# Patient Record
Sex: MALE | Race: WHITE | NOT HISPANIC OR LATINO | Employment: STUDENT | URBAN - METROPOLITAN AREA
[De-identification: names, ages, dates, MRNs, and addresses within clinical notes are randomized per-mention and may not be internally consistent; named-entity substitution may affect disease eponyms.]

---
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
## 2020-07-03 ENCOUNTER — OFFICE VISIT - RIVER FALLS (OUTPATIENT)
Dept: FAMILY MEDICINE | Facility: CLINIC | Age: 20
End: 2020-07-03

## 2020-07-03 ASSESSMENT — MIFFLIN-ST. JEOR: SCORE: 2120.62

## 2020-11-03 ENCOUNTER — OFFICE VISIT - RIVER FALLS (OUTPATIENT)
Dept: FAMILY MEDICINE | Facility: CLINIC | Age: 20
End: 2020-11-03

## 2020-11-03 ASSESSMENT — MIFFLIN-ST. JEOR: SCORE: 2183.22

## 2020-11-05 LAB
CHLAMYDIA TRACHOMATIS RNA, TMA - QUEST: DETECTED
HIV AG/AB  - NOTE: NORMAL
Lab: ABNORMAL
NEISSERIA GONORRHOEAE RNA TMA: NOT DETECTED
RPR TITER: ABNORMAL
RPR: REACTIVE

## 2020-12-03 ENCOUNTER — OFFICE VISIT - RIVER FALLS (OUTPATIENT)
Dept: FAMILY MEDICINE | Facility: CLINIC | Age: 20
End: 2020-12-03

## 2020-12-05 ENCOUNTER — COMMUNICATION - RIVER FALLS (OUTPATIENT)
Dept: FAMILY MEDICINE | Facility: CLINIC | Age: 20
End: 2020-12-05

## 2020-12-05 LAB
CHLAMYDIA TRACHOMATIS RNA, TMA - QUEST: NOT DETECTED
Lab: ABNORMAL
NEISSERIA GONORRHOEAE RNA TMA: NOT DETECTED
RPR TITER: ABNORMAL
RPR: REACTIVE

## 2022-02-12 VITALS
SYSTOLIC BLOOD PRESSURE: 114 MMHG | HEART RATE: 99 BPM | DIASTOLIC BLOOD PRESSURE: 74 MMHG | WEIGHT: 202.6 LBS | TEMPERATURE: 102.5 F

## 2022-02-12 VITALS
DIASTOLIC BLOOD PRESSURE: 68 MMHG | WEIGHT: 219.2 LBS | BODY MASS INDEX: 25.88 KG/M2 | HEIGHT: 77 IN | HEART RATE: 76 BPM | TEMPERATURE: 97.6 F | SYSTOLIC BLOOD PRESSURE: 118 MMHG

## 2022-02-12 VITALS
HEIGHT: 77 IN | HEART RATE: 80 BPM | SYSTOLIC BLOOD PRESSURE: 134 MMHG | BODY MASS INDEX: 27.04 KG/M2 | DIASTOLIC BLOOD PRESSURE: 62 MMHG | WEIGHT: 229 LBS | TEMPERATURE: 97.8 F

## 2022-02-12 VITALS
HEART RATE: 73 BPM | SYSTOLIC BLOOD PRESSURE: 121 MMHG | BODY MASS INDEX: 26.98 KG/M2 | WEIGHT: 229 LBS | DIASTOLIC BLOOD PRESSURE: 75 MMHG

## 2022-02-12 VITALS
HEART RATE: 84 BPM | HEIGHT: 77 IN | SYSTOLIC BLOOD PRESSURE: 130 MMHG | BODY MASS INDEX: 26.35 KG/M2 | TEMPERATURE: 98.4 F | WEIGHT: 223.2 LBS | DIASTOLIC BLOOD PRESSURE: 82 MMHG

## 2022-02-12 VITALS
SYSTOLIC BLOOD PRESSURE: 134 MMHG | HEIGHT: 77 IN | HEART RATE: 98 BPM | BODY MASS INDEX: 27.51 KG/M2 | TEMPERATURE: 98 F | OXYGEN SATURATION: 98 % | DIASTOLIC BLOOD PRESSURE: 84 MMHG | WEIGHT: 233 LBS

## 2022-02-12 VITALS
HEART RATE: 76 BPM | SYSTOLIC BLOOD PRESSURE: 138 MMHG | WEIGHT: 229 LBS | BODY MASS INDEX: 26.98 KG/M2 | TEMPERATURE: 97.9 F | DIASTOLIC BLOOD PRESSURE: 66 MMHG

## 2022-02-12 VITALS
TEMPERATURE: 97.8 F | WEIGHT: 222.4 LBS | SYSTOLIC BLOOD PRESSURE: 121 MMHG | DIASTOLIC BLOOD PRESSURE: 75 MMHG | WEIGHT: 218.8 LBS | DIASTOLIC BLOOD PRESSURE: 73 MMHG | BODY MASS INDEX: 26.37 KG/M2 | SYSTOLIC BLOOD PRESSURE: 117 MMHG | BODY MASS INDEX: 25.95 KG/M2 | HEART RATE: 106 BPM | TEMPERATURE: 97.4 F | HEART RATE: 74 BPM

## 2022-02-12 VITALS
DIASTOLIC BLOOD PRESSURE: 79 MMHG | WEIGHT: 216 LBS | SYSTOLIC BLOOD PRESSURE: 137 MMHG | BODY MASS INDEX: 25.5 KG/M2 | HEART RATE: 94 BPM | HEIGHT: 77 IN | TEMPERATURE: 97.1 F

## 2022-02-12 VITALS
HEIGHT: 77 IN | BODY MASS INDEX: 27.16 KG/M2 | TEMPERATURE: 96.6 F | RESPIRATION RATE: 16 BRPM | DIASTOLIC BLOOD PRESSURE: 70 MMHG | HEART RATE: 80 BPM | WEIGHT: 230 LBS | SYSTOLIC BLOOD PRESSURE: 114 MMHG

## 2022-02-16 NOTE — PROGRESS NOTES
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    Integumentary   Psychiatric:  Negative except as documented in history of present illness.       Health Status   Allergies:    Allergic Reactions (Selected)  No Known Medication Allergies   Medications:  (Selected)   Prescriptions  Prescribed  Diprolene AF 0.05% topical cream: 1 ananda, Topical, bid, Instructions: to affected area, # 50 gm, 3 Refill(s), Type: Maintenance, Pharmacy: [email or web address], 1 ananda Topical bid,Instr:to affected area  buPROPion 75 mg oral tablet: = 1 tab(s) ( 75 mg ), Oral, bid, # 60 tab(s), 0 Refill(s), Type: Maintenance, Pharmacy: [email or web address], 1 tab(s) Oral bid, 77.25, in, 20 9:15:00 CDT, Height Measured, 219.2, lb, 20 9:15:00 CDT, Weight Measured  escitalopram 20 mg oral tablet: = 1 tab(s), Oral, daily, # 90 tab(s), 3 Refill(s), Type: Soft Stop, Pharmacy: [email or web address], 1 tab(s) Oral daily   Problem list:    All Problems  Anxiety and depression / SNOMED CT 365779893 / Confirmed  Other psoriasis / SNOMED CT 06148999 / Confirmed      Histories   Past Medical History:    No active or resolved past medical history items have been selected or recorded.   Family History:    No family  history items have been selected or recorded.   Procedure history:    No previous procedures.   Social History:        Alcohol Assessment            Never, 2-3 times per week      Tobacco Assessment            Never (less than 100 in lifetime)        Physical Examination   VS/Measurements   Psychiatric:  Cooperative, Non-suicidal.       Impression and Plan   Diagnosis     Anxiety and depression (EKF59-LN F41.9).     Course:  Not improving.    Plan:  Add bupropion   15 min spent Face-to-face   1 mo fu.    Patient Instructions:       Counseled: Patient, Regarding diagnosis, Regarding treatment, Regarding medications.

## 2022-02-16 NOTE — NURSING NOTE
Comprehensive Intake Entered On:  7/3/2020 9:19 AM CDT    Performed On:  7/3/2020 9:15 AM CDT by Raquel Muro CMA               Summary   Chief Complaint :   Anxiety/depression med check/refill   Menstrual Status :   N/A   Weight Measured :   219.2 lb(Converted to: 219 lb 3 oz, 99.43 kg)    Height Measured :   77.25 in(Converted to: 6 ft 5 in, 196.21 cm)    Body Mass Index :   25.82 kg/m2   Body Surface Area :   2.33 m2   Systolic Blood Pressure :   118 mmHg   Diastolic Blood Pressure :   68 mmHg   Mean Arterial Pressure :   85 mmHg   Peripheral Pulse Rate :   76 bpm   BP Site :   Right arm   Pulse Site :   Radial artery   BP Method :   Manual   HR Method :   Manual   Temperature Tympanic :   97.6 DegF(Converted to: 36.4 DegC)  (LOW)    Raquel Muro CMA - 7/3/2020 9:15 AM CDT   Health Status   Allergies Verified? :   Yes   Medication History Verified? :   Yes   Immunizations Current :   Yes   Medical History Verified? :   No   Pre-Visit Planning Status :   Completed   Tobacco Use? :   Current every day smoker   Raquel Muro CMA - 7/3/2020 9:15 AM CDT   Consents   Consent for Immunization Exchange :   Consent Granted   Consent for Immunizations to Providers :   Consent Granted   Raquel Muro CMA - 7/3/2020 9:15 AM CDT   Meds / Allergies   (As Of: 7/3/2020 9:19:23 AM CDT)   Allergies (Active)   No Known Medication Allergies  Estimated Onset Date:   Unspecified ; Created By:   Diana Ireland; Reaction Status:   Active ; Category:   Drug ; Substance:   No Known Medication Allergies ; Type:   Allergy ; Updated By:   Diana Ireland; Reviewed Date:   7/3/2020 9:17 AM CDT        Medication List   (As Of: 7/3/2020 9:19:23 AM CDT)   Prescription/Discharge Order    betamethasone topical  :   betamethasone topical ; Status:   Prescribed ; Ordered As Mnemonic:   Diprolene AF 0.05% topical cream ; Simple Display Line:   1 ananda, Topical, bid, to affected area, 50 gm, 3 Refill(s) ; Ordering Provider:   Cailin MCDOWELL,  Anthony; Catalog Code:   betamethasone topical ; Order Dt/Tm:   8/29/2019 12:14:55 PM CDT          escitalopram  :   escitalopram ; Status:   Prescribed ; Ordered As Mnemonic:   escitalopram 20 mg oral tablet ; Simple Display Line:   1 tab(s), Oral, daily, 90 tab(s), 3 Refill(s) ; Ordering Provider:   Anthony Simeon MD; Catalog Code:   escitalopram ; Order Dt/Tm:   7/19/2019 3:29:05 PM CDT            ID Risk Screen   Recent Travel History :   No recent travel   Family Member Travel History :   No recent travel   Other Exposure to Infectious Disease :   Unknown   Raquel Muro CMA - 7/3/2020 9:15 AM CDT

## 2022-02-16 NOTE — TELEPHONE ENCOUNTER
---------------------  From: Ailyn Jiménez   To: Anthony Simeon MD;     Sent: 7/3/2019 3:36:15 PM CDT  Subject: Scheduling Management     Patient no showed appointment. Appointment was for a med refill/updating vaccines.

## 2022-02-16 NOTE — PROGRESS NOTES
Chief Complaint    c/o sore throat x1wk--feeling better this AM. denies having fever. has been coughing also.  History of Present Illness      Here with sore throat for the last week.      no F/C/S      Feeling better today      non productive cough         Review of Systems          ROS reviewed an negative except for symptoms noted in HPI.            Physical Exam   Vitals & Measurements    T: 97.8   F (Tympanic)  HR: 80(Peripheral)  BP: 134/62     HT: 77 in  WT: 229 lb  BMI: 27.15           General:  Alert and oriented, No acute distress.            HENT:  Normocephalic, Tympanic membranes are clear, Normal hearing, Oral mucosa is moist.                 Throat: Tonsils ( Within normal limits ), Pharynx ( Not edematous, Erythematous, No exudate, PND ).            Neck:  anterior cervical adenopathy.            Respiratory:  Lungs are clear to auscultation, Respirations are non-labored.            Cardiovascular:  Normal rate, Regular rhythm.            Integumentary:  Warm, Dry, No rash.  Assessment/Plan       Common cold (J00)         symptomatic care and follow up if not improving         Orders:          71825 office outpatient visit 15 minutes (Charge), Quantity: 1, Common cold           Patient Information     Name:TIM FRANCE      Address:      [address] [address]     Sex:Male     YOB: 2000     [phone]     Emergency Contact:DEMETRICE FRANCE     MRN:[id number]     FIN:[id number]     Location:[facility]     Date of Service:09/25/2018      Primary Care Physician:       Anthony Simeon MD, [phone]      Attending Physician:       Carroll Kunz MD, [phone]  Problem List/Past Medical History    Ongoing     Anxiety and depression    Historical     No qualifying data  Medications     Lexapro 10 mg oral tablet: 10 mg, 1 tab(s), PO, Daily, 30 tab(s), 3 Refill(s).          Allergies    No known allergies  Social History     Smoking Status - 09/25/2018     Never smoker     Alcohol      Never, 07/13/2018  Immunizations      Vaccine Date Status      varicella 11/20/2012 Given      meningococcal conjugate vaccine 11/20/2012 Given      tetanus/diphth/pertuss (Tdap) adult/adol 11/20/2012 Given      IPV 05/23/2006 Recorded      MMR (measles/mumps/rubella) 05/23/2006 Recorded      DTaP 05/23/2006 Recorded      pneumococcal (PCV7) 02/13/2002 Recorded      DTaP 02/13/2002 Recorded      MMR (measles/mumps/rubella) 11/29/2001 Recorded      varicella 11/29/2001 Recorded      IPV 09/13/2001 Recorded      Hib (PRP-T) 09/13/2001 Recorded      Hep B 09/13/2001 Recorded      pneumococcal (PCV7) 02/26/2001 Recorded      DTaP 02/26/2001 Recorded      IPV 01/03/2001 Recorded      Hib (PRP-T) 01/03/2001 Recorded      Hep B 01/03/2001 Recorded      pneumococcal (PCV7) 01/03/2001 Recorded      DTaP 01/03/2001 Recorded      IPV 2000 Recorded      Hib (PRP-T) 2000 Recorded      Hep B 2000 Recorded      DTaP 2000 Recorded      Hep B 2000 Recorded

## 2022-02-16 NOTE — NURSING NOTE
Comprehensive Intake Entered On:  11/3/2020 9:35 AM CST    Performed On:  11/3/2020 9:33 AM CST by Trish Chau CMA               Summary   Chief Complaint :   Discuss STD screening   Menstrual Status :   N/A   Weight Measured :   233 lb(Converted to: 233 lb 0 oz, 105.687 kg)    Height Measured :   77.25 in(Converted to: 6 ft 5 in, 196.21 cm)    Body Mass Index :   27.45 kg/m2 (HI)    Body Surface Area :   2.4 m2   Systolic Blood Pressure :   134 mmHg (HI)    Diastolic Blood Pressure :   84 mmHg (HI)    Mean Arterial Pressure :   101 mmHg   Peripheral Pulse Rate :   98 bpm   BP Site :   Right arm   BP Method :   Manual   Temperature Tympanic :   98.0 DegF(Converted to: 36.7 DegC)    Oxygen Saturation :   98 %   Trish Chau CMA - 11/3/2020 9:33 AM CST   Health Status   Allergies Verified? :   Yes   Medication History Verified? :   Yes   Immunizations Current :   Yes   Pre-Visit Planning Status :   N/A   Tobacco Use? :   Current every day smoker   Trish Chau CMA - 11/3/2020 9:33 AM CST   Meds / Allergies   (As Of: 11/3/2020 9:35:41 AM CST)   Allergies (Active)   No Known Medication Allergies  Estimated Onset Date:   Unspecified ; Created By:   Diana Ireland; Reaction Status:   Active ; Category:   Drug ; Substance:   No Known Medication Allergies ; Type:   Allergy ; Updated By:   Diana Ireland; Reviewed Date:   7/7/2020 1:48 PM CDT        Medication List   (As Of: 11/3/2020 9:35:41 AM CST)   Prescription/Discharge Order    buPROPion  :   buPROPion ; Status:   Prescribed ; Ordered As Mnemonic:   buPROPion 75 mg oral tablet ; Simple Display Line:   75 mg, 1 tab(s), Oral, bid, 28 tab(s), 0 Refill(s) ; Ordering Provider:   Anthony Simeon MD; Catalog Code:   buPROPion ; Order Dt/Tm:   8/3/2020 2:57:15 PM CDT          escitalopram  :   escitalopram ; Status:   Prescribed ; Ordered As Mnemonic:   escitalopram 20 mg oral tablet ; Simple Display Line:   1 tab(s), Oral, daily, 14 tab(s), 0 Refill(s) ; Ordering  Provider:   Anthony Simeon MD; Catalog Code:   escitalopram ; Order Dt/Tm:   8/3/2020 2:57:16 PM CDT            ID Risk Screen   Recent Travel History :   No recent travel   Family Member Travel History :   No recent travel   Other Exposure to Infectious Disease :   Unknown   Trish Chau CMA - 11/3/2020 9:33 AM CST

## 2022-02-16 NOTE — LETTER
(Inserted Image. Unable to display)   March 12, 2019      TIM FRANCE  [id number] [place], [place] [id number]        Dear TIM,      Thank you for selecting [facility] ([facility]viously [place] [facility] for your healthcare needs.    Our records indicate you are due for the following services:       Well Child Exam~ It's important to see your Healthcare Provider on a regular basis to assess growth, development, life changes, safety, health risks and to update your immunizations.      To schedule an appointment or if you have further questions, please contact your primary clinic:   Critical access hospital       [phone]   [facility]       [phone]              [facility]     [phone]      Powered by "[employer] and [employer]    Sincerely,    Anthony Simeon MD

## 2022-02-16 NOTE — LETTER
(Inserted Image. Unable to display)   January 22, 2020        TIM FRANCE  [address] [place], [place] [id number]        Dear TIM,      Thank you for selecting [facility] for your healthcare needs.    Our records indicate you are due for the following services:     Follow-up office visit     To schedule an appointment or if you have further questions, please contact your primary clinic:   [facility]       [phone]   [facility]       [phone]              [facility]     [phone]      Powered by [employer]    Sincerely,    Anthony Simeon M.D.

## 2022-02-16 NOTE — PROGRESS NOTES
patient is called and informed of positive syphilis and chlamydia.  He has already been treated with azithromycin  for chlamydia.  Will add 2 week course of doxycycline.  Retest in 6 weeks

## 2022-02-16 NOTE — TELEPHONE ENCOUNTER
---------------------  From: Ailyn Jiménez   To: Anthony Simeon MD;     Sent: 7/30/2019 11:21:02 AM CDT  Subject: Scheduling Management     Patient no showed appointment. Appointment was for wart removal .

## 2022-02-16 NOTE — TELEPHONE ENCOUNTER
Entered by Dennis Wright CMA on June 14, 2019 9:07:44 AM CDT  ---------------------  From: Dennis Wright CMA   To: [facility] [id number] [place] [facility]    Sent: 6/14/2019 9:07:44 AM CDT  Subject: Medication Management     ** Submitted: **  Complete:escitalopram (Lexapro 20 mg oral tablet)   Signed by Dennis Wright CMA  6/14/2019 9:07:00 AM    ** Approved with modifications: **  escitalopram (ESCITALOPRAM 20 MG TABLET)  TAKE 1 TABLET BY MOUTH EVERY DAY  Qty:  30 tab(s)        Days Supply:  90        Refills:  0          Substitutions Allowed     Route To Pharmacy - [place] [id number] [facility]   Note to Pharmacy:  Pt due for appt for further refills  Signed by Dennis Wright CMA            ------------------------------------------  From: [facility] [id number] [place] [facility]  To: Anthony Simeon MD  Sent: June 14, 2019 7:33:38 AM CDT  Subject: Medication Management  Due: Rima 15, 2019 7:33:38 AM CDT    ** On Hold Pending Signature **  Drug: escitalopram (escitalopram 20 mg oral tablet)  TAKE 1 TABLET BY MOUTH EVERY DAY  Quantity: 30 tab(s)     Days Supply: 90        Refills: 5  Substitutions Allowed  Notes from Pharmacy:     Dispensed Drug: escitalopram (escitalopram 20 mg oral tablet)  TAKE 1 TABLET BY MOUTH EVERY DAY  Quantity: 30 tab(s)     Days Supply: 90        Refills: 5  Substitutions Allowed  Notes from Pharmacy:   ------------------------------------------  ** Submitted: **  Order:Return to Clinic (Request)  Details:  RFV: Anxiety/Depression medcheck, Return in 4 weeks         Signed by Dennis Wright CMA  6/14/2019 9:07:00 AM        Med Refill      Date of last office visit and reason:  12-6-18 Depression      Date of last Med Check / Px:     Date of last labs pertaining to med:      Note:  Rx filled per protocol.  Dennis Wright CMA    RTC order in chart:  yes    For Protocol refill, has patient been contacted:  _

## 2022-02-16 NOTE — TELEPHONE ENCOUNTER
---------------------  From: Diana Ireland   To: Alison Wilson PA-C; Anthony Simeon MD; Phone Messages Pool ([phone]);     Sent: 12/5/2020 12:41:26 PM CST  Subject: Results Follow Up     Please review result and advise if okay to wait for TFS to review.           Results:  Date Result Name Ind Value Ref Range   12/3/2020 10:49 AM Chlam/N. gonorrhea Comments  See comment    12/3/2020 10:49 AM RPR Ql ((A)) REACTIVE (NONREACTIVE - NONREACTIVE)   12/3/2020 10:49 AM RPR Titer ((H)) 1:2    12/3/2020 10:49 AM FTA-ABS  NON-REACTIVE (NONREACTIVE - NONREACTIVE)   12/3/2020 10:49 AM Chlamydia RNA  NOT DETECTED (NOT DETECTED - )   12/3/2020 10:49 AM Neisseria gonorrhoeae RNA  NOT DETECTED (NOT DETECTED - )---------------------  From: Alison Wilson PA-C   To: Anthony Simeon MD; Phone Messages Pool ([phone]); Diana Ireland;     Sent: 12/5/2020 1:33:03 PM CST  Subject: RE: Results Follow Up     The confirmatory FTA-ABS test is non-reactive at baseline and 4 weeks and the RPR titer only increased to 1:2.    This likely represents a false positive nontreponemal test (RPR) which can occur with other chronic conditions including autoimmune disorders.  Will have TFS review based on his clinical knowledge of this pt and may address next week.---------------------  From: Anthony Simeon MD   To: Diana Ireland;     Sent: 12/6/2020 10:54:10 AM CST  Subject: RE: Results Follow Up     please call him  this likely represents a false positive nontreponemal test (RPR) which can occur with other chronic conditions including autoimmune disorders such as  his psoriasis  I want him to get internal medicine referral with Dr Daniels in next month or so to consult on the results and further testing for autoimmune disordersSpoke with Jez and notified of recommendation.  He agrees and will call back later on this week to set up a consult with CHLOE..

## 2022-02-16 NOTE — PROGRESS NOTES
Patient:   TIM FRANCE            MRN: [id number]            FIN: [id number]               Age:   20 years     Sex:  Male     :  2000   Associated Diagnoses:   STD (male)   Author:   Anthony Simeon MD      Visit Information      Date of Service: 2020 10:20 am  Performing Location: [facility]  Encounter#: [id number]      Primary Care Provider (PCP):  Anthony Simeon MD    NPI# [id number]      Referring Provider:  Anthony Simeon MD# [id number]      Chief Complaint   12/3/2020 10:29 AM CST   follow up STD        History of Present Illness   chief complaint and symptoms as noted above confirmed with patient   no sxs  took meds as directed      Review of Systems   Constitutional:  Negative.    Genitourinary:  Negative.       Health Status   Allergies:    Allergic Reactions (Selected)  No Known Medication Allergies   Medications:  (Selected)   Prescriptions  Prescribed  buPROPion 75 mg oral tablet: = 1 tab(s) ( 75 mg ), Oral, bid, # 28 tab(s), 0 Refill(s), Type: Maintenance, Pharmacy: [facility], 1 tab(s) Oral bid, 77.25, in, 20 9:15:00 CDT, Height Measured, Weight Measured  escitalopram 20 mg oral tablet: = 1 tab(s), Oral, daily, # 14 tab(s), 0 Refill(s), Type: Soft Stop, Pharmacy: [facility], 1 tab(s) Oral daily, 77.25, in, 20 9:15:00 CDT, Height Measured, 219.2, lb, 20 9:15:00 CDT, Weight Measured   Problem list:    All Problems  Anxiety and depression / SNOMED CT 526604237 / Confirmed  Other psoriasis / SNOMED CT 46885460 / Confirmed      Histories   Past Medical History:    No active or resolved past medical history items have been selected or recorded.   Family History:    No family history items have been selected or recorded.   Procedure history:    No previous procedures.   Social History:        Electronic Cigarette/Vaping Assessment            Electronic Cigarette Use: 1-25 inhales/day.  Type: Nicotine infused.      Alcohol  Assessment            Never, 2-3 times per week      Tobacco Assessment            Never (less than 100 in lifetime)        Physical Examination   Measurements from flowsheet : Measurements   12/3/2020 10:29 AM CST   Weight Measured - Standard                229 lb     General:  Alert and oriented, No acute distress.       Review / Management   Results review:  Lab results   11/3/2020 9:50 AM CST Chlam/N. gonorrhea Comments See comment    HIV Ag/Ab NON-REACTIVE    RPR Ql REACTIVE    RPR Titer 1:1    FTA-ABS NON-REACTIVE    Chlamydia RNA DETECTED    Neisseria gonorrhoeae RNA NOT DETECTED   .       Impression and Plan   Diagnosis     STD (male) ([id number] A64).     Course:  Progressing as expected.    Plan:  Patient with history of chlamydia infection treated also borderline results on syphilis also treated I suspect the RPR is a false positive test looking at the negative antibody screen.  We will recheck today to see if there is any evidence of an increasing titer or antibiotics at this point.  He does have plaque psoriasis on each elbow which my understanding is psoriasis may cause a false positive test.       .    Patient Instructions:       Counseled: Patient, Regarding diagnosis, Regarding treatment.

## 2022-02-16 NOTE — PROGRESS NOTES
Patient:   TIM FRANCE            MRN: [id number]            FIN: [id number]               Age:   18 years     Sex:  Male     :  2000   Associated Diagnoses:   None   Author:   Gloria Bro MA       -   Today's date:    2018.        -   To whom it may concern:        This patient Was seen in my office on  2018.  .  He/ she may return to school, without restrictions.  Please contact me if you have any questions or concerns.      -   Sincerely,   Dr. Kunz    [facility]    [phone]

## 2022-02-16 NOTE — PROGRESS NOTES
Patient:   TIM FRANCE            MRN: [id number]            FIN: [id number]               Age:   17 years     Sex:  Male     :  2000   Associated Diagnoses:   Anxiety and depression   Author:   Anthony Simeon MD      Visit Information      Date of Service: 2018 04:00 pm  Performing Location: [facility]  Encounter#: [id number]      Primary Care Provider (PCP):  Anthony Simeon MD    NPI# [id number]      Referring Provider:  Anthony Simeon MD, NPI# [id number]      Chief Complaint   2018 4:03 PM CDT    1 month f/up lexapro        Interval History   HX anxiety/depression  has seen psycholgy   No substance abuse  Lexapro helping           Review of Systems   Constitutional:  Negative except as documented in history of present illness.    Integumentary   Psychiatric:  Negative except as documented in history of present illness.       Health Status   Allergies:    Allergic Reactions (Selected)  No known allergies   Medications:  (Selected)   Prescriptions  Prescribed  Lexapro 10 mg oral tablet: 1 tab(s) ( 10 mg ), PO, Daily, # 30 tab(s), 3 Refill(s), Type: Maintenance, Pharmacy: [facility] [id number] [facility], 1 tab(s) Oral daily      Histories   Past Medical History:    No active or resolved past medical history items have been selected or recorded.   Family History:    No family history items have been selected or recorded.   Procedure history:    No active procedure history items have been selected or recorded.   Social History:        Alcohol Assessment            Never        Physical Examination   Vital Signs   2018 4:03 PM CDT Temperature Tympanic 97.4 DegF  LOW    Peripheral Pulse Rate 106 bpm  HI    HR Method Electronic    Systolic Blood Pressure 117 mmHg    Diastolic Blood Pressure 73 mmHg    Mean Arterial Pressure 88 mmHg    BP Method Electronic      Measurements from flowsheet : Measurements   2018 4:03 PM CDT    Weight Measured - Standard                222.4 lb      Psychiatric:  Cooperative, Non-suicidal.       Impression and Plan   Diagnosis     Anxiety and depression (JYY03-BP F41.9).     Course:  Improving.    Plan:  increase lexapro   15 min spent Face-to-face   therapy  3 mo fu.    Patient Instructions:       Counseled: Patient, Regarding diagnosis, Regarding treatment, Regarding medications.

## 2022-02-16 NOTE — TELEPHONE ENCOUNTER
Entered by Laureen Mcmahan CMA on August 03, 2020 11:35:54 AM CDT  ---------------------  From: Laureen Mcmahan CMA   To: [facility] [id number] [facility]    Sent: 8/3/2020 11:35:54 AM CDT  Subject: Medication Management     ** Not Approved: Patient needs appointment, Due to med being added **  buPROPion (BUPROPION HCL 75 MG TABLET)  TAKE 1 TABLET BY MOUTH TWICE A DAY  Qty:  60 tab(s)        Days Supply:  30        Refills:  0          Substitutions Allowed     Route To Pharmacy - [id number] [facility]   Signed by Laureen Mcmahan CMA            ------------------------------------------  From: [facility] [id number] [facility]  To: Anthony Simeon MD  Sent: August 1, 2020 8:33:17 AM CDT  Subject: Medication Management  Due: July 28, 2020 10:28:08 PM CDT     ** On Hold Pending Signature **     Dispensed Drug: buPROPion (buPROPion 75 mg oral tablet), TAKE 1 TABLET BY MOUTH TWICE A DAY  Quantity: 60 tab(s)  Days Supply: 30  Refills: 0  Substitutions Allowed  Notes from Pharmacy:  ------------------------------------------

## 2022-02-16 NOTE — LETTER
(Inserted Image. Unable to display)   July 17, 2019        TIM FRANCE  [id number] [place], [place] [id number]        Dear TIM,      Thank you for selecting [facility] (previously [facility] [place]) for your healthcare needs.    Our records indicate you are due for the following services:     Medication Check     To schedule an appointment or if you have further questions, please contact your primary clinic:   [facility]       [phone]   [facility]       [phone]              [facility]     [phone]      Powered by [employer]    Sincerely,    Anthony Simeon M.D.

## 2022-02-16 NOTE — PROGRESS NOTES
Patient:   TIM FRANCE            MRN: [id number]            FIN: [id number]               Age:   19 years     Sex:  Male     :  2000   Associated Diagnoses:   Other psoriasis   Author:   Anthony Simeon MD      Visit Information      Primary Care Provider (PCP):  Anthony Simeon MD# [id number]      Referring Provider:  Anthony Simeon MD# [id number]      Chief Complaint   2019 12:04 PM CDT   wart removal on both elbows - has hx of tx 3 yrs ago.        History of Present Illness   chief complaint and symptoms as noted above confirmed with patient   no other sx      Review of Systems   Constitutional:  Negative except as documented in history of present illness.    Musculoskeletal:  Negative.    Integumentary:  Negative except as documented in history of present illness.       Health Status   Allergies:    Allergic Reactions (Selected)  No Known Medication Allergies   Medications:  (Selected)   Prescriptions  Prescribed  Diprolene AF 0.05% topical cream: 1 ananda, Topical, bid, Instructions: to affected area, # 50 gm, 3 Refill(s), Type: Maintenance, Pharmacy: [facility], 1 ananda Topical bid,Instr:to affected area  escitalopram 20 mg oral tablet: = 1 tab(s), Oral, daily, # 90 tab(s), 3 Refill(s), Type: Soft Stop, Pharmacy: [facility], 1 tab(s) Oral daily   Problem list:    All Problems  Anxiety and depression / SNOMED CT 636941956 / Confirmed      Histories   Past Medical History:    No active or resolved past medical history items have been selected or recorded.   Family History:    No family history items have been selected or recorded.   Procedure history:    No previous procedures.   Social History:        Alcohol Assessment            Never, 2-3 times per week      Tobacco Assessment            Never (less than 100 in lifetime)        Physical Examination   Vital Signs   2019 12:04 PM CDT Peripheral Pulse Rate 73 bpm    Pulse Site Radial artery    HR Method  Electronic    Systolic Blood Pressure 121 mmHg    Diastolic Blood Pressure 75 mmHg    Mean Arterial Pressure 90 mmHg    BP Site Right arm    BP Method Electronic      Measurements from flowsheet : Measurements   8/29/2019 12:04 PM CDT   Weight Measured - Standard                229 lb     General:  Alert and oriented, No acute distress.    Integumentary:  small plaques psoriasis elbows.    Neurologic:  Alert.       Impression and Plan   Diagnosis     Other psoriasis (ZMA56-KU L40.8).     Course:  Progressing as expected.    Plan:  steroid cr prn  1 yr fu.    Patient Instructions:       Counseled: Patient, Regarding diagnosis, Regarding medications, Activity.

## 2022-02-16 NOTE — PROGRESS NOTES
Patient:   TIM FRANCE            MRN: [id number]            FIN: [id number]               Age:   18 years     Sex:  Male     :  2000   Associated Diagnoses:   Anxiety and depression   Author:   Anthony Simeon MD      Visit Information      Date of Service: 2018 08:40 am  Performing Location: [facility]  Encounter#: [id number]      Primary Care Provider (PCP):  Anthony Simeon MD    NPI# [id number]      Referring Provider:  Anthony Simeon MD, NPI# [id number]      Chief Complaint   2018 8:48 AM CST    Pt here for medcheck and refills for his anxiety/depression.        Interval History   HX anxiety/depression  has seen psycholgy   No substance abuse  Lexapro helping Wants to try higher dose           Review of Systems   Constitutional:  Negative except as documented in history of present illness.    Integumentary   Psychiatric:  Negative except as documented in history of present illness.       Health Status   Allergies:    Allergic Reactions (Selected)  No known allergies   Medications:  (Selected)   Prescriptions  Prescribed  Lexapro 20 mg oral tablet: = 1 tab(s) ( 20 mg ), PO, Daily, # 30 tab(s), 5 Refill(s), Type: Maintenance, [id number], 1 tab(s) Oral daily   Problem list:    All Problems  Anxiety and depression / SNOMED CT 591336397 / Confirmed      Histories   Past Medical History:    No active or resolved past medical history items have been selected or recorded.   Family History:    No family history items have been selected or recorded.   Procedure history:    No previous procedures.   Social History:        Alcohol Assessment            Never      Tobacco Assessment            Never (less than 100 in lifetime)      Physical Examination   Vital Signs   2018 8:48 AM CST Temperature Tympanic 96.6 DegF  LOW    Peripheral Pulse Rate 80 bpm    Pulse Site Radial artery    Respiratory Rate 16 br/min    Systolic Blood Pressure 114 mmHg    Diastolic  Blood Pressure 70 mmHg    Mean Arterial Pressure 85 mmHg    BP Site Right arm      Measurements from flowsheet : Measurements   12/6/2018 8:48 AM CST Height Measured - Standard 77.25 in    Weight Measured - Standard 230 lb    BSA 2.38 m2    Body Mass Index 27.09 kg/m2    Body Mass Index Percentile 90.20      Psychiatric:  Cooperative, Non-suicidal.       Impression and Plan   Diagnosis     Anxiety and depression (IYH61-CE F41.9).     Course:  Improving.    Plan:  increase lexapro   15 min spent Face-to-face   therapy  6 mo fu, discussed SAD.    Patient Instructions:       Counseled: Patient, Regarding diagnosis, Regarding treatment, Regarding medications.

## 2022-02-16 NOTE — NURSING NOTE
Comprehensive Intake Entered On:  8/29/2019 12:07 PM CDT    Performed On:  8/29/2019 12:04 PM CDT by Destinee Cárdenas CMA               Summary   Chief Complaint :   wart removal on both elbows - has hx of tx 3 yrs ago.   Menstrual Status :   N/A   Weight Measured :   229 lb(Converted to: 229 lb 0 oz, 103.87 kg)    Systolic Blood Pressure :   121 mmHg   Diastolic Blood Pressure :   75 mmHg   Mean Arterial Pressure :   90 mmHg   Peripheral Pulse Rate :   73 bpm   BP Site :   Right arm   Pulse Site :   Radial artery   BP Method :   Electronic   HR Method :   Electronic   Destinee Cárdenas CMA - 8/29/2019 12:04 PM CDT   Health Status   Allergies Verified? :   Yes   Medication History Verified? :   Yes   Immunizations Current :   Yes   Pre-Visit Planning Status :   Completed   Destinee Cárdenas CMA - 8/29/2019 12:04 PM CDT   Meds / Allergies   (As Of: 8/29/2019 12:07:48 PM CDT)   Allergies (Active)   No Known Medication Allergies  Estimated Onset Date:   Unspecified ; Created By:   Diana Ireland; Reaction Status:   Active ; Category:   Drug ; Substance:   No Known Medication Allergies ; Type:   Allergy ; Updated By:   Diana Ireland; Reviewed Date:   7/19/2019 1:53 PM CDT        Medication List   (As Of: 8/29/2019 12:07:48 PM CDT)   Prescription/Discharge Order    escitalopram  :   escitalopram ; Status:   Prescribed ; Ordered As Mnemonic:   escitalopram 20 mg oral tablet ; Simple Display Line:   1 tab(s), Oral, daily, 90 tab(s), 3 Refill(s) ; Ordering Provider:   Anthony Simeon MD; Catalog Code:   escitalopram ; Order Dt/Tm:   7/19/2019 3:29:05 PM

## 2022-02-16 NOTE — NURSING NOTE
Depression Screening Entered On:  7/22/2019 9:46 AM CDT    Performed On:  7/19/2019 9:44 AM CDT by Cat Ferrer               Depression Screening   Little Interest - Pleasure in Activities :   Several days   Feeling Down, Depressed, Hopeless :   Several days   Initial Depression Screen Score :   2    Trouble Falling or Staying Asleep :   Nearly every day   Feeling Tired or Little Energy :   More than half the days   Poor Appetite or Overeating :   More than half the days   Feeling Bad About Yourself :   Several days   Trouble Concentrating :   Not at all   Moving or Speaking Slowly :   Not at all   Thoughts Better Off Dead or Hurting Self :   Several days   Detailed Depression Screen Score :   9    Total Depression Screen Score :   11    BOB Difficulty with Work, Home, Others :   Somewhat difficult   Cat Ferrer - 7/22/2019 9:44 AM CDT

## 2022-02-16 NOTE — NURSING NOTE
Comprehensive Intake Entered On:  7/19/2019 1:54 PM CDT    Performed On:  7/19/2019 1:53 PM CDT by Diana Ireland               Summary   Chief Complaint :   Medicaiton check up.    Menstrual Status :   N/A   Weight Measured :   223.2 lb(Converted to: 223 lb 3 oz, 101.24 kg)    Height Measured :   77.25 in(Converted to: 6 ft 5 in, 196.21 cm)    Body Mass Index :   26.29 kg/m2   Body Surface Area :   2.35 m2   Systolic Blood Pressure :   130 mmHg   Diastolic Blood Pressure :   82 mmHg (HI)    Mean Arterial Pressure :   98 mmHg   Peripheral Pulse Rate :   84 bpm   BP Method :   Electronic   HR Method :   Electronic   Temperature Tympanic :   98.4 DegF(Converted to: 36.9 DegC)    Diana Ireland - 7/19/2019 1:53 PM CDT   Health Status   Allergies Verified? :   Yes   Medication History Verified? :   Yes   Immunizations Current :   Yes   Pre-Visit Planning Status :   Completed   Tobacco Use? :   Never smoker   Diana Ireland - 7/19/2019 1:53 PM CDT

## 2022-02-16 NOTE — PROGRESS NOTES
Patient:   TIM FRANCE            MRN: [id number]            FIN: [id number]               Age:   17 years     Sex:  Male     :  2000   Associated Diagnoses:   Depression   Author:   Anthony Simeon MD      Visit Information      Date of Service: 07/10/2018 02:56 pm  Performing Location: [facility]  Encounter#: [id number]      Primary Care Provider (PCP):  Anthony Simeon MD    NPI# [id number]      Referring Provider:  Anthony Simeon MD, NPI# [id number]      Chief Complaint   7/10/2018 3:03 PM CDT    Discuss depression.        Interval History   HX anxiety/depression  has seen psycholgy Would like rx  No substance abuse        Depression   The course is worsening.  Compliance problems: not with diet and not with exercise program.  Associated symptoms characterized by no insomnia, weight gain, weight loss, loss of appetite or suicidal thoughts.        Review of Systems   Constitutional:  Negative except as documented in history of present illness.    Integumentary   Psychiatric:  Negative except as documented in history of present illness.       Health Status   Allergies:    Allergic Reactions (Selected)  No known allergies   Medications:  (Selected)   Prescriptions  Prescribed  Lexapro 5 mg oral tablet: 1 tab(s) ( 5 mg ), PO, Daily, # 30 tab(s), 1 Refill(s), Type: Maintenance, [id number], 1 tab(s) Oral daily      Histories   Past Medical History:    No active or resolved past medical history items have been selected or recorded.   Family History:    No family history items have been selected or recorded.   Procedure history:    No active procedure history items have been selected or recorded.   Social History:             No active social history items have been recorded.      Physical Examination   Vital Signs   7/10/2018 3:03 PM CDT Temperature Tympanic 97.8 DegF  LOW    Peripheral Pulse Rate 74 bpm    HR Method Electronic    Systolic Blood Pressure 121 mmHg     Diastolic Blood Pressure 75 mmHg    Mean Arterial Pressure 90 mmHg    BP Method Electronic      Measurements from flowsheet : Measurements   7/10/2018 3:03 PM CDT    Weight Measured - Standard                218.8 lb     Psychiatric:  Cooperative, Non-suicidal.       Impression and Plan   Diagnosis     Depression (BYV22-QM F32.9).     Course:  Not well controlled.    Plan:  Start lexapro   15 min spent Face-to-face   therapy  1 mo fu.    Patient Instructions:       Counseled: Patient, Regarding diagnosis, Regarding treatment, Regarding medications.

## 2022-02-16 NOTE — LETTER
(Inserted Image. Unable to display)         January 07, 2021        TIM FRANCE  [address] [place], [place] [id number]        Dear TIM,    Thank you for selecting [facility] for your healthcare needs.    Our records indicate you are due for the following services:     Follow-up office visit      (FYI   Regarding office visits: In some instances, a video visit or telephone visit may be offered as an option.)    To schedule an appointment or if you have further questions, please contact your clinic at [phone].    Powered by [employer]    Sincerely,    Juan Daniels MD

## 2022-02-16 NOTE — NURSING NOTE
CAGE Assessment Entered On:  7/22/2019 9:44 AM CDT    Performed On:  7/19/2019 9:44 AM CDT by Cat Ferrer               Assessment   Have you ever felt you should cut down on your drinking :   No   Have people annoyed you by criticizing your drinking :   No   Have you ever felt bad or guilty about your drinking :   No   Have you ever taken a drink first thing in the morning to steady your nerves or get rid of a hangover (Eye-opener) :   No   CAGE Score :   0    Cat Ferrer - 7/22/2019 9:44 AM CDT

## 2022-02-16 NOTE — PROGRESS NOTES
Patient:   TIM FRANCE            MRN: [id number]            FIN: [id number]               Age:   16 years     Sex:  Male     :  2000   Associated Diagnoses:   Abdominal pain in male   Author:   Anthony Simeon MD      Visit Information      Date of Service: 2017 08:11 am  Performing Location: [facility]  Encounter#: [id number]      Primary Care Provider (PCP):  Anthony Simeon MD    NPI# [id number]      Referring Provider:  No referring provider recorded for selected visit.      Chief Complaint   2017 8:14 AM CDT    Stomach ache, vomiting, chills, fever.  Ongoing 3 days.        History of Present Illness   chief complaint and symptoms as noted above confirmed with patient   Patient is here with abdominal pain fevers chills and vomiting.  Onset early Monday of periumbilical discomfort.  Seem to worsen on Tuesday along with nausea.  He started developing chills and sweats on Tuesday by once he had some vomiting worsening nausea and much worse abdominal pain.  Fevers up to 102.  Double over today with pain at times.  No urinary complaints.  slight cold that started just today.  No rashes no bites no exposures.       Review of Systems   Constitutional:  Negative except as documented in history of present illness.    Eye:  Negative.    Ear/Nose/Mouth/Throat:  Negative.    Respiratory:  Negative except as documented in history of present illness.    Cardiovascular:  Negative.    Gastrointestinal:  Negative except as documented in history of present illness.    Genitourinary:  Negative.    Musculoskeletal:  Negative except as documented in history of present illness.    Integumentary:  Negative.    Neurologic:  Negative except as documented in history of present illness.             Health Status   Allergies:    Allergic Reactions (Selected)  No known allergies   Medications:  (Selected)   Prescriptions  Prescribed  Zofran ODT 4 mg oral tablet, disintegratin tab(s) ( 4 mg ), po,  tid, PRN: for nausea/vomiting, # 10 tab(s), 0 Refill(s), Type: Maintenance, [id number], 1 tab(s) po tid,PRN:for nausea/vomiting      Histories   Past Medical History:    No active or resolved past medical history items have been selected or recorded.   Family History:    No family history items have been selected or recorded.   Procedure history:    No active procedure history items have been selected or recorded.   Social History:             No active social history items have been recorded.      Physical Examination   Vital Signs   5/18/2017 8:14 AM CDT Temperature Tympanic 102.5 DegF  HI    Peripheral Pulse Rate 99 bpm  HI    Systolic Blood Pressure 114 mmHg    Diastolic Blood Pressure 74 mmHg    Mean Arterial Pressure 87 mmHg      Measurements from flowsheet : Measurements   5/18/2017 8:14 AM CDT    Weight Measured - Standard                202.6 lb     General:  Alert and oriented.    Eye:  Normal conjunctiva.    HENT:  Tympanic membranes are clear, No pharyngeal erythema.    Neck:  Supple, Non-tender, No lymphadenopathy.    Respiratory:  Lungs are clear to auscultation, Respirations are non-labored.    Cardiovascular:  Normal rate, Regular rhythm.    Gastrointestinal:  Normal bowel sounds, No organomegaly.         Abdomen: All four quadrants, Tenderness, No guarding, No rebound tenderness.    Genitourinary:  No costovertebral angle tenderness.    Musculoskeletal:  No tenderness, No swelling.    Integumentary:  No rash.    Neurologic:  Alert, Oriented.       Review / Management   Results review:  Lab results   5/18/2017 10:06 AM CDT UA Color Yellow    UA Clarity Clear    UA pH 7.5    UA Specific Gravity <=1.005    UA Glucose Negative mg/dL    UA Bilirubin Negative    UA Ketones Negative mg/dL    Urine Occult Blood Negative    UA Protein Negative mg/dL    UA Nitrite Negative    UA Leukocyte Esterase Negative    UA Urobilinogen Normal    Rapid Strep A Negative   5/18/2017 8:49 AM CDT Sodium  Level 137 mmol/L    Potassium Level 4.0 mmol/L    Chloride Level 102 mmol/L    CO2 Level 24 mmol/L    AGAP 11    Glucose Level 117 mg/dL    BUN 12 mg/dL    Creatinine Level 0.89 mg/dL    BUN/Creat Ratio 13    eGFR  *NOT VALUED*    eGFR Non- *NOT VALUED*    Calcium Level 9.8 mg/dL    WBC 7.4    RBC 5.09    Hgb 15.0 g/dL    Hct 42.7 %    MCV 84 fL    MCH 29.5 pg    MCHC 35.1 g/dL    RDW 12.3 %    Platelet 170    MPV 9.7 fL    Lymphocytes 14.0 %    Abs Lymphocytes 1.0    Neutrophils 71.6 %    Abs Neutrophils 5.3    Monocytes 14.1 %    Abs Monocytes 1.0    Eosinophils 0.0 %    Abs Eosinophils 0.0    Basophils 0.3 %    Abs Basophils 0.0   .    Radiology results   Computed tomography, Reveals no acute disease process      Impression and Plan   Diagnosis     Abdominal pain in male (UEL29-YX R10.9).     Course:  Not progressing as expected.    Plan:  Abdominal pain vomiting with fever labs and CT are reassuring will look at symptomatic control at home if fevers persist over the next 48 hours will need a revisit.  He will call sooner if worse.  Likely viral infection.  Consider hepatic issues if not better and will draw liver function at that time.  He was given Ativan here which helped his anxiety  .    Patient Instructions:       Counseled: Patient, Family, Regarding diagnosis, Regarding treatment, Regarding medications, Diet.

## 2022-02-16 NOTE — PROGRESS NOTES
Patient:   TIM FRANCE            MRN: [id number]            FIN: [id number]               Age:   18 years     Sex:  Male     :  2000   Associated Diagnoses:   Anxiety and depression   Author:   Anthony Simeon MD      Visit Information      Date of Service: 2019 01:48 pm  Performing Location: [facility]  Encounter#: [id number]      Primary Care Provider (PCP):  Anthony Simeon MD    NPI# [id number]      Referring Provider:  Anthony Simeon MD# [id number]      Chief Complaint   2019 1:53 PM CDT    Medicaiton check up.        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    Integumentary   Psychiatric:  Negative except as documented in history of present illness.       Health Status   Allergies:    Allergic Reactions (Selected)  No Known Medication Allergies   Medications:  (Selected)   Prescriptions  Prescribed  escitalopram 20 mg oral tablet: 1 tab(s), Oral, daily, # 30 tab(s), Type: Soft Stop, Pharmacy: [facility]   Problem list:    All Problems  Anxiety and depression / SNOMED CT [id number] / Confirmed      Histories   Past Medical History:    No active or resolved past medical history items have been selected or recorded.   Family History:    No family history items have been selected or recorded.   Procedure history:    No previous procedures.   Social History:        Alcohol Assessment            Never      Tobacco Assessment            Never (less than 100 in lifetime)      Physical Examination   Vital Signs   2019 1:53 PM CDT Temperature Tympanic 98.4 DegF    Peripheral Pulse Rate 84 bpm    HR Method Electronic    Systolic Blood Pressure 130 mmHg    Diastolic Blood Pressure 82 mmHg  HI    Mean Arterial Pressure 98 mmHg    BP Method  Electronic      Measurements from flowsheet : Measurements   7/19/2019 1:53 PM CDT Height Measured - Standard 77.25 in    Weight Measured - Standard 223.2 lb    BSA 2.35 m2    Body Mass Index 26.29 kg/m2    Body Mass Index Percentile 85.24      Psychiatric:  Cooperative, Non-suicidal.       Impression and Plan   Diagnosis     Anxiety and depression (TQE94-IJ F41.9).     Course:  Improving.    Plan:     15 min spent Face-to-face   6 mo fu.    Patient Instructions:       Counseled: Patient, Regarding diagnosis, Regarding treatment, Regarding medications.

## 2022-02-16 NOTE — TELEPHONE ENCOUNTER
Entered by Laureen Mcmahan CMA on August 03, 2020 2:58:47 PM CDT  Spoke w/ patient to remind him he is due for 1mth med check per TFS. He said he would call back to schedule. Sent 2 week supply of meds.      ------------------------------------------  From: [id number]  To: Anthony Simeon MD  Sent: August 2, 2020 9:43:21 AM CDT  Subject: Medication Management  Due: July 28, 2020 10:28:08 PM CDT     ** On Hold Pending Signature **     Dispensed Drug: escitalopram (escitalopram 20 mg oral tablet), TAKE 1 TABLET BY MOUTH EVERY DAY  Quantity: 90 tab(s)  Days Supply: 90  Refills: 3  Substitutions Allowed  Notes from Pharmacy:  ---------------------------------------------------------------  From: Laureen Mcmahan CMA   To: [facility] [id number] [facility]    Sent: 8/3/2020 2:58:56 PM CDT  Subject: Medication Management     ** Not Approved: Refill not appropriate **  escitalopram (ESCITALOPRAM 20 MG TABLET)  TAKE 1 TABLET BY MOUTH EVERY DAY  Qty:  90 tab(s)        Days Supply:  90        Refills:  3          Substitutions Allowed     Route [id number]   Signed by Laureen Mcmahan CMA

## 2022-02-16 NOTE — PROGRESS NOTES
Patient:   TIM FRANCE            MRN: [id number]            FIN: [id number]               Age:   20 years     Sex:  Male     :  2000   Associated Diagnoses:   Exposure to chlamydia; Screening for STDs (sexually transmitted diseases)   Author:   Albino Underwood PA-C      Chief Complaint   11/3/2020 9:33 AM CST    Discuss STD screening      History of Present Illness   here today for STD screening, his girlfriend found out she tested positive for chlamydia  he has some intermittent burning with urination, no rashes      Review of Systems   Constitutional:  Negative.    Ear/Nose/Mouth/Throat:  Negative.    Respiratory:  Negative.    Cardiovascular:  Negative.    Gastrointestinal:  Negative.    Genitourinary:  Dysuria, No penile discharge.       Health Status   Allergies:    Allergic Reactions (Selected)  No Known Medication Allergies   Medications:  (Selected)   Prescriptions  Prescribed  buPROPion 75 mg oral tablet: = 1 tab(s) ( 75 mg ), Oral, bid, # 28 tab(s), 0 Refill(s), Type: Maintenance, Pharmacy: [facility], 1 tab(s) Oral bid, 77.25, in, 20 9:15:00 CDT, Height Measured, Weight Measured  escitalopram 20 mg oral tablet: = 1 tab(s), Oral, daily, # 14 tab(s), 0 Refill(s), Type: Soft Stop, Pharmacy: [facility], 1 tab(s) Oral daily, 77.25, in, 20 9:15:00 CDT, Height Measured, 219.2, lb, 20 9:15:00 CDT, Weight Measured      Histories   Past Medical History:    No active or resolved past medical history items have been selected or recorded.   Family History:    No family history items have been selected or recorded.   Procedure history:    No previous procedures.   Social History:        Alcohol Assessment            Never, 2-3 times per week      Tobacco Assessment            Never (less than 100 in lifetime)        Physical Examination   Vital Signs   11/3/2020 9:33 AM CST Temperature Tympanic 98.0 DegF    Peripheral Pulse Rate 98 bpm    Systolic Blood Pressure 134 mmHg  HI     Diastolic Blood Pressure 84 mmHg  HI    Mean Arterial Pressure 101 mmHg    BP Site Right arm    BP Method Manual    Oxygen Saturation 98 %      Measurements from flowsheet : Measurements   11/3/2020 9:33 AM CST Height Measured - Standard 77.25 in    Weight Measured - Standard 233 lb    BSA 2.4 m2    Body Mass Index 27.45 kg/m2  HI      General:  No acute distress.    Respiratory:  Lungs are clear to auscultation.    Cardiovascular:  Normal rate, Regular rhythm, No murmur.    Genitourinary:  testicles smooth symmetrical, without nodules, no rashes or lesions, no hernia present.       Impression and Plan   Diagnosis     Exposure to chlamydia (MFS42-GQ Z20.2).     Screening for STDs (sexually transmitted diseases) (JPY42-LO Z11.3).     Orders     Orders   Pharmacy:  azithromycin 500 mg oral tablet (Prescribe): = 2 tab(s) ( 1,000 mg ), Oral, once, # 2 tab(s), 0 Refill(s), Type: Soft Stop, Pharmacy: [facility] [id number] [facility], 2 tab(s) Oral once, 77.25, in, 11/3/2020 9:33 AM CST, Height Measured, 233, lb, 11/3/2020 9:33 AM CST, Weight Measured.     Orders   Lab (Gen Lab  Reference Lab):  RPR (dx) w/refl titer and confirmatory testing* ([facility]) (Order): Specimen Type: Serum, Collection Date: 11/3/2020 9:44 AM CST  HIV-1/2 Antigen and Antibodies, Fourth Generation, with Reflexes* ([facility]) (Order): Specimen Type: Serum, Collection Date: 11/3/2020 9:44 AM CST  Chlamydia/Neisseria gonorrhoeae RNA, TMA* ([facility]) (Order): Specimen Type: Urine, Collection Date: 11/3/2020 9:44 AM CST.     Orders   Charges (Evaluation and Management):  [id number] office outpatient visit 15 minutes (Charge) (Order): Quantity: 1, Exposure to chlamydia  Screening for STDs (sexually transmitted diseases).     Summary:  will treat for chlamydia with 1 gm azithromycin, and will test for STDs.   Yes

## 2022-02-16 NOTE — TELEPHONE ENCOUNTER
Entered by Laureen Mcmahan CMA on May 29, 2019 10:41:02 AM CDT  ---------------------  From: Laureen Mcmahan CMA   To: [facility] [id number] [facility]    Sent: 5/29/2019 10:41:02 AM CDT  Subject: Medication Management     ** Not Approved: Patient needs appointment, 3 mths overdue for annual **  escitalopram (ESCITALOPRAM 20 MG TABLET)  TAKE 1 TABLET BY MOUTH EVERY DAY  Qty:  30 tab(s)        Days Supply:  90        Refills:  5          Substitutions Allowed     Route To Pharmacy - [facility] [facility] [id number] [facility]   Signed by Laureen Mcmahan CMA            ------------------------------------------  From: [facility] [id number] [facility]  To: Anthony Simeon MD  Sent: May 28, 2019 1:36:01 AM CDT  Subject: Medication Management  Due: May 29, 2019 1:36:01 AM CDT    ** On Hold Pending Signature **  Drug: escitalopram (escitalopram 20 mg oral tablet)  TAKE 1 TABLET BY MOUTH EVERY DAY  Quantity: 30 tab(s)     Days Supply: 90        Refills: 5  Substitutions Allowed  Notes from Pharmacy:     Dispensed Drug: escitalopram (escitalopram 20 mg oral tablet)  TAKE 1 TABLET BY MOUTH EVERY DAY  Quantity: 30 tab(s)     Days Supply: 90        Refills: 5  Substitutions Allowed  Notes from Pharmacy:   ------------------------------------------

## 2023-01-02 ENCOUNTER — TRANSFERRED RECORDS (OUTPATIENT)
Dept: HEALTH INFORMATION MANAGEMENT | Facility: CLINIC | Age: 23
End: 2023-01-02

## 2023-01-09 ENCOUNTER — TRANSFERRED RECORDS (OUTPATIENT)
Dept: HEALTH INFORMATION MANAGEMENT | Facility: CLINIC | Age: 23
End: 2023-01-09

## 2023-01-12 ENCOUNTER — TRANSFERRED RECORDS (OUTPATIENT)
Dept: HEALTH INFORMATION MANAGEMENT | Facility: CLINIC | Age: 23
End: 2023-01-12

## 2023-01-12 ENCOUNTER — LAB REQUISITION (OUTPATIENT)
Dept: LAB | Facility: CLINIC | Age: 23
End: 2023-01-12
Payer: COMMERCIAL

## 2023-01-12 ENCOUNTER — LAB REQUISITION (OUTPATIENT)
Dept: LAB | Facility: HOSPITAL | Age: 23
End: 2023-01-12
Payer: COMMERCIAL

## 2023-01-12 DIAGNOSIS — M25.569 PAIN IN UNSPECIFIED KNEE: ICD-10-CM

## 2023-01-12 LAB
APPEARANCE FLD: ABNORMAL
BASOPHILS # BLD AUTO: 0.1 10E3/UL (ref 0–0.2)
BASOPHILS NFR BLD AUTO: 1 %
C REACTIVE PROTEIN LHE: 1.8 MG/DL (ref 0–?)
CELL COUNT BODY FLUID SOURCE: ABNORMAL
COLOR FLD: ABNORMAL
CRYSTALS SNV MICRO: NORMAL
EOSINOPHIL # BLD AUTO: 0.1 10E3/UL (ref 0–0.7)
EOSINOPHIL NFR BLD AUTO: 2 %
ERYTHROCYTE [SEDIMENTATION RATE] IN BLOOD BY WESTERGREN METHOD: 16 MM/HR (ref 0–15)
IMM GRANULOCYTES # BLD: 0 10E3/UL
IMM GRANULOCYTES NFR BLD: 0 %
LYMPHOCYTES # BLD AUTO: 2.2 10E3/UL (ref 0.8–5.3)
LYMPHOCYTES NFR BLD AUTO: 28 %
MONOCYTES # BLD AUTO: 0.7 10E3/UL (ref 0–1.3)
MONOCYTES NFR BLD AUTO: 9 %
NEUTROPHILS # BLD AUTO: 4.7 10E3/UL (ref 1.6–8.3)
NEUTROPHILS NFR BLD AUTO: 60 %
NRBC # BLD AUTO: 0 10E3/UL
NRBC BLD AUTO-RTO: 0 /100
RBC # FLD: ABNORMAL /UL
T4 FREE SERPL-MCNC: 1.24 NG/DL (ref 0.9–1.7)
TSH SERPL DL<=0.005 MIU/L-ACNC: 1.64 UIU/ML (ref 0.3–5)
URATE SERPL-MCNC: 4.9 MG/DL (ref 3–8)
WBC # BLD AUTO: 7.9 10E3/UL (ref 4–11)
WBC # FLD AUTO: 2296 /UL

## 2023-01-12 PROCEDURE — 85652 RBC SED RATE AUTOMATED: CPT | Mod: ORL | Performed by: ORTHOPAEDIC SURGERY

## 2023-01-12 PROCEDURE — 84550 ASSAY OF BLOOD/URIC ACID: CPT | Mod: ORL | Performed by: ORTHOPAEDIC SURGERY

## 2023-01-12 PROCEDURE — 86038 ANTINUCLEAR ANTIBODIES: CPT | Mod: ORL | Performed by: ORTHOPAEDIC SURGERY

## 2023-01-12 PROCEDURE — 89051 BODY FLUID CELL COUNT: CPT | Mod: ORL | Performed by: ORTHOPAEDIC SURGERY

## 2023-01-12 PROCEDURE — 86618 LYME DISEASE ANTIBODY: CPT | Mod: ORL | Performed by: ORTHOPAEDIC SURGERY

## 2023-01-12 PROCEDURE — 86140 C-REACTIVE PROTEIN: CPT | Mod: ORL | Performed by: ORTHOPAEDIC SURGERY

## 2023-01-12 PROCEDURE — 84480 ASSAY TRIIODOTHYRONINE (T3): CPT | Mod: ORL | Performed by: ORTHOPAEDIC SURGERY

## 2023-01-12 PROCEDURE — 87070 CULTURE OTHR SPECIMN AEROBIC: CPT | Mod: ORL | Performed by: ORTHOPAEDIC SURGERY

## 2023-01-12 PROCEDURE — 87102 FUNGUS ISOLATION CULTURE: CPT | Mod: ORL | Performed by: ORTHOPAEDIC SURGERY

## 2023-01-12 PROCEDURE — 87205 SMEAR GRAM STAIN: CPT | Mod: ORL | Performed by: ORTHOPAEDIC SURGERY

## 2023-01-12 PROCEDURE — 89060 EXAM SYNOVIAL FLUID CRYSTALS: CPT | Mod: ORL | Performed by: ORTHOPAEDIC SURGERY

## 2023-01-12 PROCEDURE — 86431 RHEUMATOID FACTOR QUANT: CPT | Mod: ORL | Performed by: ORTHOPAEDIC SURGERY

## 2023-01-12 PROCEDURE — 84443 ASSAY THYROID STIM HORMONE: CPT | Mod: ORL | Performed by: ORTHOPAEDIC SURGERY

## 2023-01-12 PROCEDURE — 84439 ASSAY OF FREE THYROXINE: CPT | Mod: ORL | Performed by: ORTHOPAEDIC SURGERY

## 2023-01-12 PROCEDURE — 85004 AUTOMATED DIFF WBC COUNT: CPT | Mod: ORL | Performed by: ORTHOPAEDIC SURGERY

## 2023-01-12 PROCEDURE — 36415 COLL VENOUS BLD VENIPUNCTURE: CPT | Mod: ORL | Performed by: ORTHOPAEDIC SURGERY

## 2023-01-12 PROCEDURE — 87075 CULTR BACTERIA EXCEPT BLOOD: CPT | Mod: ORL | Performed by: ORTHOPAEDIC SURGERY

## 2023-01-12 PROCEDURE — 84436 ASSAY OF TOTAL THYROXINE: CPT | Mod: ORL | Performed by: ORTHOPAEDIC SURGERY

## 2023-01-13 LAB
ANA SER QL IF: NEGATIVE
B BURGDOR IGG+IGM SER QL: 0.04
EOSINOPHIL NFR FLD MANUAL: 1 %
GRAM STAIN RESULT: NORMAL
GRAM STAIN RESULT: NORMAL
LYMPHOCYTES NFR FLD MANUAL: 44 %
MONOS+MACROS NFR FLD MANUAL: 7 %
NEUTS BAND NFR FLD MANUAL: 48 %
RHEUMATOID FACT SER NEPH-ACNC: 7 IU/ML
T3 SERPL-MCNC: 129 NG/DL (ref 85–202)
T3FREE SERPL-MCNC: 3.8 PG/ML (ref 2–4.4)
T4 SERPL-MCNC: 7.9 UG/DL (ref 4.5–11.7)

## 2023-01-17 ENCOUNTER — MEDICAL CORRESPONDENCE (OUTPATIENT)
Dept: HEALTH INFORMATION MANAGEMENT | Facility: CLINIC | Age: 23
End: 2023-01-17

## 2023-01-18 LAB — BACTERIA SNV CULT: NO GROWTH

## 2023-01-27 LAB — BACTERIA SNV CULT: NORMAL

## 2023-02-10 LAB — BACTERIA SNV CULT: NO GROWTH

## 2023-02-12 ENCOUNTER — HEALTH MAINTENANCE LETTER (OUTPATIENT)
Age: 23
End: 2023-02-12

## 2023-02-28 ENCOUNTER — OFFICE VISIT (OUTPATIENT)
Dept: FAMILY MEDICINE | Facility: CLINIC | Age: 23
End: 2023-02-28
Payer: COMMERCIAL

## 2023-02-28 VITALS
SYSTOLIC BLOOD PRESSURE: 130 MMHG | BODY MASS INDEX: 25.15 KG/M2 | WEIGHT: 213 LBS | DIASTOLIC BLOOD PRESSURE: 76 MMHG | HEART RATE: 100 BPM | TEMPERATURE: 98.6 F | HEIGHT: 77 IN

## 2023-02-28 DIAGNOSIS — M25.561 ARTHRALGIA OF RIGHT KNEE: Primary | ICD-10-CM

## 2023-02-28 PROCEDURE — 99213 OFFICE O/P EST LOW 20 MIN: CPT | Performed by: FAMILY MEDICINE

## 2023-02-28 NOTE — PROGRESS NOTES
"  Assessment & Plan     Arthralgia of right knee  Patient with history of inflammatory arthritis right knee which is markedly improved.  He just has a small effusion left and no significant pain.  Unknown cause.  Given the extensive work-up he is already had he has a rheumatology consult coming up.  Certainly may be a reactive synovitis to a viral infection.  Follow-up if it changes.  He has minimal psoriasis findings but certainly psoriatic arthritis is a possibility but given the lack of significant positivity of his inflammatory markers doubt               BMI:   Estimated body mass index is 25.09 kg/m  as calculated from the following:    Height as of this encounter: 1.962 m (6' 5.25\").    Weight as of this encounter: 96.6 kg (213 lb).           No follow-ups on file.    Anthony Simeon MD  [facility]    Eduar Story is a 22 year old, presenting for the following health issues: Patient had episode in mid December where his right knee got very swollen and very tender.  He was seen by orthopedics they did x-rays and MRI which were both normal.  He had aspiration with multiple labs done for this which were all negative except for minimally elevated inflammatory marker.  His laboratory studies were all negative as well.  He has a consult with rheumatology coming up  Musculoskeletal Problem      History of Present Illness       Reason for visit:  Rheumetoid arthritis referal  Symptom onset:  More than a month  Symptoms include:  Knee pain&swelling  Symptom intensity:  Moderate  Symptom progression:  Staying the same  Had these symptoms before:  No  What makes it worse:  Torsion  What makes it better:  Ibuprofen, rest    He eats 0-1 servings of fruits and vegetables daily.He consumes 0 sweetened beverage(s) daily.He exercises with enough effort to increase his heart rate 10 to 19 minutes per day.  He exercises with enough effort to increase his heart rate 3 or less days per week. " "  He is taking medications regularly.     Rheum appt schedule in August - ordered by [place]. Would also like referral sent to [place].           Review of Systems   Constitutional, HEENT, cardiovascular, pulmonary, gi and gu systems are negative, except as otherwise noted.      Objective    /76 (BP Location: Right arm, Patient Position: Sitting, Cuff Size: Adult Large)   Pulse 100   Temp 98.6  F (37  C) (Temporal)   Ht 1.962 m (6' 5.25\")   Wt 96.6 kg (213 lb)   BMI 25.09 kg/m    Body mass index is 25.09 kg/m .  Physical Exam   Patient alert in no acute distress right knee today reveals full range of motion is a small effusion noted.  Otherwise no tenderness no instability CMS intact                    "

## 2023-07-06 ENCOUNTER — OFFICE VISIT (OUTPATIENT)
Dept: FAMILY MEDICINE | Facility: CLINIC | Age: 23
End: 2023-07-06
Payer: COMMERCIAL

## 2023-07-06 VITALS
SYSTOLIC BLOOD PRESSURE: 134 MMHG | WEIGHT: 231 LBS | HEIGHT: 77 IN | TEMPERATURE: 98.7 F | DIASTOLIC BLOOD PRESSURE: 80 MMHG | RESPIRATION RATE: 20 BRPM | BODY MASS INDEX: 27.28 KG/M2 | HEART RATE: 86 BPM | OXYGEN SATURATION: 99 %

## 2023-07-06 DIAGNOSIS — M25.50 MULTIPLE JOINT PAIN: Primary | ICD-10-CM

## 2023-07-06 LAB
BASOPHILS # BLD AUTO: 0.1 10E3/UL (ref 0–0.2)
BASOPHILS NFR BLD AUTO: 1 %
CRP SERPL-MCNC: 9.31 MG/L
EOSINOPHIL # BLD AUTO: 0.1 10E3/UL (ref 0–0.7)
EOSINOPHIL NFR BLD AUTO: 2 %
ERYTHROCYTE [DISTWIDTH] IN BLOOD BY AUTOMATED COUNT: 12.1 % (ref 10–15)
ERYTHROCYTE [SEDIMENTATION RATE] IN BLOOD BY WESTERGREN METHOD: 11 MM/HR (ref 0–15)
HCT VFR BLD AUTO: 41.1 % (ref 40–53)
HGB BLD-MCNC: 13.9 G/DL (ref 13.3–17.7)
IMM GRANULOCYTES # BLD: 0 10E3/UL
IMM GRANULOCYTES NFR BLD: 0 %
LYMPHOCYTES # BLD AUTO: 1.7 10E3/UL (ref 0.8–5.3)
LYMPHOCYTES NFR BLD AUTO: 30 %
MCH RBC QN AUTO: 28.1 PG (ref 26.5–33)
MCHC RBC AUTO-ENTMCNC: 33.8 G/DL (ref 31.5–36.5)
MCV RBC AUTO: 83 FL (ref 78–100)
MONOCYTES # BLD AUTO: 0.5 10E3/UL (ref 0–1.3)
MONOCYTES NFR BLD AUTO: 9 %
NEUTROPHILS # BLD AUTO: 3.3 10E3/UL (ref 1.6–8.3)
NEUTROPHILS NFR BLD AUTO: 58 %
PLATELET # BLD AUTO: 241 10E3/UL (ref 150–450)
RBC # BLD AUTO: 4.95 10E6/UL (ref 4.4–5.9)
WBC # BLD AUTO: 5.7 10E3/UL (ref 4–11)

## 2023-07-06 PROCEDURE — 85025 COMPLETE CBC W/AUTO DIFF WBC: CPT | Mod: QW | Performed by: FAMILY MEDICINE

## 2023-07-06 PROCEDURE — 87468 ANAPLSMA PHGCYTOPHLM AMP PRB: CPT | Mod: 90 | Performed by: FAMILY MEDICINE

## 2023-07-06 PROCEDURE — 86038 ANTINUCLEAR ANTIBODIES: CPT | Performed by: FAMILY MEDICINE

## 2023-07-06 PROCEDURE — 87798 DETECT AGENT NOS DNA AMP: CPT | Mod: 90 | Performed by: FAMILY MEDICINE

## 2023-07-06 PROCEDURE — 86200 CCP ANTIBODY: CPT | Performed by: FAMILY MEDICINE

## 2023-07-06 PROCEDURE — 36415 COLL VENOUS BLD VENIPUNCTURE: CPT | Performed by: FAMILY MEDICINE

## 2023-07-06 PROCEDURE — 99000 SPECIMEN HANDLING OFFICE-LAB: CPT | Performed by: FAMILY MEDICINE

## 2023-07-06 PROCEDURE — 87469 BABESIA MICROTI AMP PRB: CPT | Mod: 90 | Performed by: FAMILY MEDICINE

## 2023-07-06 PROCEDURE — 85652 RBC SED RATE AUTOMATED: CPT | Performed by: FAMILY MEDICINE

## 2023-07-06 PROCEDURE — 99213 OFFICE O/P EST LOW 20 MIN: CPT | Performed by: FAMILY MEDICINE

## 2023-07-06 PROCEDURE — 86431 RHEUMATOID FACTOR QUANT: CPT | Performed by: FAMILY MEDICINE

## 2023-07-06 PROCEDURE — 87484 EHRLICHA CHAFFEENSIS AMP PRB: CPT | Mod: 90 | Performed by: FAMILY MEDICINE

## 2023-07-06 PROCEDURE — 86140 C-REACTIVE PROTEIN: CPT | Performed by: FAMILY MEDICINE

## 2023-07-06 RX ORDER — IBUPROFEN 200 MG
400 TABLET ORAL EVERY 8 HOURS PRN
COMMUNITY
End: 2023-08-07

## 2023-07-06 NOTE — PROGRESS NOTES
"  Assessment & Plan     Multiple joint pain  Patient with several month history now of multiple joint pain including right knee and left ankle.  He has rheumatology visit coming up in August.  We will repeat his labs very suspicious for inflammatory arthritis.  - CBC with Platelets & Differential; Future  - PATRICIO Scrn Rflx to Titer and Ptrn ([facility])  - Erythrocyte sedimentation rate auto; Future  - Rheumatoid factor; Future  - Cyclic Citrullinated Peptide Antibody IgG; Future  - CRP inflammation; Future  - Ehrlichia Anaplasma Sp by PCR; Future  - Babesia Species by PCR; Future             BMI:   Estimated body mass index is 27.22 kg/m  as calculated from the following:    Height as of this encounter: 1.962 m (6' 5.25\").    Weight as of this encounter: 104.8 kg (231 lb).           Anthony Simeon MD  [facility]    Eduar Story is a 22 year old, presenting for the following health issues: Patient has persistent knee problems since he was seen last spring.  He had multiple labs done in the winter for his right knee pain and swelling.  He has had persistent symptoms since then always having issues with his left ankle as well.  No injuries.  No fevers chills or sweats.  He takes ibuprofen twice a day.  Sister has lupus.  No other joint complaints other than right knee and left ankle  Knee Pain (R knee. No known injury. Was seen for in March. Rheum appt is in August. ) and Ankle Pain (Left. No known injury. )        7/6/2023     7:00 AM   Additional Questions   Roomed by [place]     History of Present Illness       Reason for visit:  Knee and ankle pain    He eats 2-3 servings of fruits and vegetables daily.He consumes 0 sweetened beverage(s) daily.He exercises with enough effort to increase his heart rate 10 to 19 minutes per day.  He exercises with enough effort to increase his heart rate 3 or less days per week.   He is taking medications regularly.               Review of Systems " "  Constitutional, HEENT, cardiovascular, pulmonary, gi and gu systems are negative, except as otherwise noted.      Objective    /80 (BP Location: Right arm, Patient Position: Sitting, Cuff Size: Adult Large)   Pulse 86   Temp 98.7  F (37.1  C) (Temporal)   Resp 20   Ht 1.962 m (6' 5.25\")   Wt 104.8 kg (231 lb)   SpO2 99%   BMI 27.22 kg/m    Body mass index is 27.22 kg/m .  Physical Exam   Patient is alert no acute distress right knee today reveals full range of motion he has a joint effusion noted.  Some mild joint tenderness no redness or increased warmth left ankle exam today is normal.  No other evidence of joint inflammation upper or lower extremities.                    "

## 2023-07-06 NOTE — LETTER
July 9, 2023      Jez Francis  [id number] [facility] [address]        Dear ,    We are writing to inform you of your test results.    Test results indicate you may require additional follow up, see comment below.  One of the blood test looking at inflammation is elevated the other tests are normal.  Please follow-up with the rheumatologist as we discussed.    Resulted Orders   Erythrocyte sedimentation rate auto   Result Value Ref Range    Erythrocyte Sedimentation Rate 11 0 - 15 mm/hr   Rheumatoid factor   Result Value Ref Range    Rheumatoid Factor <6 <12 IU/mL   CRP inflammation   Result Value Ref Range    CRP Inflammation 9.31 (H) <5.00 mg/L   Ehrlichia Anaplasma Sp by PCR   Result Value Ref Range    Anaplasma phagocytophilum Not Detected     Ehrlichia chaffeensis Not Detected     Ehrlichia ewingii/canis Not Detected     Ehrlichia muris-like Not Detected       Comment:      INTERPRETIVE INFORMATION:  Ehrlichia and Anaplasma Species   by PCR      A negative result does not rule out the presence of PCR   inhibitors in the patient specimen or test-specific nucleic   acid in concentrations below the level of detection by this   assay.    This test was developed and its performance characteristics   determined by [facility]. It has not been cleared or   approved by the [place] Food and Drug Administration. This test   was performed in a CLIA certified laboratory and is   intended for clinical purposes.  Performed by [facility],  [address] [place] [id number] [phone]  [email or web address], Rupert Avendaño MD, PHD, Lab. Director   Babesia Species by PCR   Result Value Ref Range    Babesia Species by PCR Not Detected     Babesia Microti by PCR Not Detected       Comment:      INTERPRETIVE INFORMATION: Babesia Species by PCR    A negative result does not rule out the presence of PCR   inhibitors in the patient specimen or test-specific nucleic   acid in concentrations below the level of  detection by this   test.    This test was developed and its performance characteristics   determined by [facility]. It has not been cleared or   approved by the US Food and Drug Administration. This test   was performed in a CLIA certified laboratory and is   intended for clinical purposes.  Performed by [facility],  [facility] [place] [place] [address] [phone]  [email or web address], Rupert Avendaño MD, PHD, Lab. Director   Anti Nuclear Namita IgG by IFA with Reflex   Result Value Ref Range    PATRICIO interpretation Negative Negative      Comment:        Negative:              <1:40  Borderline Positive:   1:40 - 1:80  Positive:              >1:80   CBC with platelets and differential   Result Value Ref Range    WBC Count 5.7 4.0 - 11.0 10e3/uL    RBC Count 4.95 4.40 - 5.90 10e6/uL    Hemoglobin 13.9 13.3 - 17.7 g/dL    Hematocrit 41.1 40.0 - 53.0 %    MCV 83 78 - 100 fL    MCH 28.1 26.5 - 33.0 pg    MCHC 33.8 31.5 - 36.5 g/dL    RDW 12.1 10.0 - 15.0 %    Platelet Count 241 150 - 450 10e3/uL    % Neutrophils 58 %    % Lymphocytes 30 %    % Monocytes 9 %    % Eosinophils 2 %    % Basophils 1 %    % Immature Granulocytes 0 %    Absolute Neutrophils 3.3 1.6 - 8.3 10e3/uL    Absolute Lymphocytes 1.7 0.8 - 5.3 10e3/uL    Absolute Monocytes 0.5 0.0 - 1.3 10e3/uL    Absolute Eosinophils 0.1 0.0 - 0.7 10e3/uL    Absolute Basophils 0.1 0.0 - 0.2 10e3/uL    Absolute Immature Granulocytes 0.0 <=0.4 10e3/uL       If you have any questions or concerns, please call the clinic at the number listed above.       Sincerely,      Anthony Simeon MD

## 2023-07-06 NOTE — LETTER
July 12, 2023      Jez Francis  [id number] [facility] [address]        Dear ,    We are writing to inform you of your test results.    Your test results fall within the expected range(s) or remain unchanged from previous results.  Please continue with current treatment plan.    Resulted Orders   Erythrocyte sedimentation rate auto   Result Value Ref Range    Erythrocyte Sedimentation Rate 11 0 - 15 mm/hr   Rheumatoid factor   Result Value Ref Range    Rheumatoid Factor <6 <12 IU/mL   Cyclic Citrullinated Peptide Antibody IgG   Result Value Ref Range    Cyclic Citrullinated Peptide Antibody IgG 1.3 <7.0 U/mL      Comment:      Negative   CRP inflammation   Result Value Ref Range    CRP Inflammation 9.31 (H) <5.00 mg/L   Ehrlichia Anaplasma Sp by PCR   Result Value Ref Range    Anaplasma phagocytophilum Not Detected     Ehrlichia chaffeensis Not Detected     Ehrlichia ewingii/canis Not Detected     Ehrlichia muris-like Not Detected       Comment:      INTERPRETIVE INFORMATION:  Ehrlichia and Anaplasma Species   by PCR      A negative result does not rule out the presence of PCR   inhibitors in the patient specimen or test-specific nucleic   acid in concentrations below the level of detection by this   assay.    This test was developed and its performance characteristics   determined by [facility]. It has not been cleared or   approved by the US Food and Drug Administration. This test   was performed in a CLIA certified laboratory and is   intended for clinical purposes.  Performed by [facility],  [address] [place] [address] [phone]  [email or web address], Rupert Avendaño MD, PHD, Lab. Director   Babesia Species by PCR   Result Value Ref Range    Babesia Species by PCR Not Detected     Babesia Microti by PCR Not Detected       Comment:      INTERPRETIVE INFORMATION: Babesia Species by PCR    A negative result does not rule out the presence of PCR   inhibitors in the patient specimen  or test-specific nucleic   acid in concentrations below the level of detection by this   test.    This test was developed and its performance characteristics   determined by [facility]. It has not been cleared or   approved by the US Food and Drug Administration. This test   was performed in a CLIA certified laboratory and is   intended for clinical purposes.  Performed by [facility],  [facility] [place] [address] [phone]  [email or web address], Rupert Avendaño MD, PHD, Lab. Director   Anti Nuclear Namita IgG by IFA with Reflex   Result Value Ref Range    PATRICIO interpretation Negative Negative      Comment:        Negative:              <1:40  Borderline Positive:   1:40 - 1:80  Positive:              >1:80   CBC with platelets and differential   Result Value Ref Range    WBC Count 5.7 4.0 - 11.0 10e3/uL    RBC Count 4.95 4.40 - 5.90 10e6/uL    Hemoglobin 13.9 13.3 - 17.7 g/dL    Hematocrit 41.1 40.0 - 53.0 %    MCV 83 78 - 100 fL    MCH 28.1 26.5 - 33.0 pg    MCHC 33.8 31.5 - 36.5 g/dL    RDW 12.1 10.0 - 15.0 %    Platelet Count 241 150 - 450 10e3/uL    % Neutrophils 58 %    % Lymphocytes 30 %    % Monocytes 9 %    % Eosinophils 2 %    % Basophils 1 %    % Immature Granulocytes 0 %    Absolute Neutrophils 3.3 1.6 - 8.3 10e3/uL    Absolute Lymphocytes 1.7 0.8 - 5.3 10e3/uL    Absolute Monocytes 0.5 0.0 - 1.3 10e3/uL    Absolute Eosinophils 0.1 0.0 - 0.7 10e3/uL    Absolute Basophils 0.1 0.0 - 0.2 10e3/uL    Absolute Immature Granulocytes 0.0 <=0.4 10e3/uL       If you have any questions or concerns, please call the clinic at the number listed above.       Sincerely,      Anthony Simeon MD

## 2023-07-07 LAB
ANA SER QL IF: NEGATIVE
RHEUMATOID FACT SER NEPH-ACNC: <6 IU/ML

## 2023-07-08 LAB
A PHAGOCYTOPH DNA BLD QL NAA+PROBE: NOT DETECTED
B MICROTI DNA BLD QL NAA+PROBE: NOT DETECTED
BABESIA DNA BLD QL NAA+PROBE: NOT DETECTED
E CHAFFEENSIS DNA BLD QL NAA+PROBE: NOT DETECTED
E EWINGII DNA SPEC QL NAA+PROBE: NOT DETECTED
EHRLICHIA DNA SPEC QL NAA+PROBE: NOT DETECTED

## 2023-07-12 LAB — CCP AB SER IA-ACNC: 1.3 U/ML

## 2023-08-07 ENCOUNTER — OFFICE VISIT (OUTPATIENT)
Dept: RHEUMATOLOGY | Facility: CLINIC | Age: 23
End: 2023-08-07
Payer: COMMERCIAL

## 2023-08-07 ENCOUNTER — ANCILLARY PROCEDURE (OUTPATIENT)
Dept: GENERAL RADIOLOGY | Facility: CLINIC | Age: 23
End: 2023-08-07
Attending: NURSE PRACTITIONER
Payer: COMMERCIAL

## 2023-08-07 VITALS
TEMPERATURE: 97.7 F | DIASTOLIC BLOOD PRESSURE: 86 MMHG | SYSTOLIC BLOOD PRESSURE: 133 MMHG | WEIGHT: 233.9 LBS | HEART RATE: 90 BPM | BODY MASS INDEX: 27.56 KG/M2 | OXYGEN SATURATION: 100 %

## 2023-08-07 DIAGNOSIS — M54.50 CHRONIC LOW BACK PAIN WITHOUT SCIATICA, UNSPECIFIED BACK PAIN LATERALITY: ICD-10-CM

## 2023-08-07 DIAGNOSIS — G89.29 CHRONIC LOW BACK PAIN WITHOUT SCIATICA, UNSPECIFIED BACK PAIN LATERALITY: ICD-10-CM

## 2023-08-07 DIAGNOSIS — M25.472 LEFT ANKLE SWELLING: Primary | ICD-10-CM

## 2023-08-07 DIAGNOSIS — M02.39 REACTIVE ARTHRITIS OF MULTIPLE SITES (H): ICD-10-CM

## 2023-08-07 DIAGNOSIS — M25.472 LEFT ANKLE SWELLING: ICD-10-CM

## 2023-08-07 PROBLEM — L40.9 PSORIASIS: Status: ACTIVE | Noted: 2023-08-07

## 2023-08-07 PROBLEM — F41.8 MIXED ANXIETY DEPRESSIVE DISORDER: Status: ACTIVE | Noted: 2023-08-07

## 2023-08-07 LAB
ALT SERPL W P-5'-P-CCNC: 40 U/L (ref 0–70)
AST SERPL W P-5'-P-CCNC: 25 U/L (ref 0–45)
CREAT SERPL-MCNC: 0.78 MG/DL (ref 0.67–1.17)
CRP SERPL-MCNC: 6.11 MG/L
ERYTHROCYTE [SEDIMENTATION RATE] IN BLOOD BY WESTERGREN METHOD: 2 MM/HR (ref 0–15)
GFR SERPL CREATININE-BSD FRML MDRD: >90 ML/MIN/1.73M2

## 2023-08-07 PROCEDURE — 86140 C-REACTIVE PROTEIN: CPT | Performed by: NURSE PRACTITIONER

## 2023-08-07 PROCEDURE — 86215 DEOXYRIBONUCLEASE ANTIBODY: CPT | Mod: 90 | Performed by: NURSE PRACTITIONER

## 2023-08-07 PROCEDURE — 82565 ASSAY OF CREATININE: CPT | Performed by: NURSE PRACTITIONER

## 2023-08-07 PROCEDURE — 82306 VITAMIN D 25 HYDROXY: CPT | Performed by: NURSE PRACTITIONER

## 2023-08-07 PROCEDURE — 36415 COLL VENOUS BLD VENIPUNCTURE: CPT | Performed by: NURSE PRACTITIONER

## 2023-08-07 PROCEDURE — 81374 HLA I TYPING 1 ANTIGEN LR: CPT | Performed by: NURSE PRACTITIONER

## 2023-08-07 PROCEDURE — 72200 X-RAY EXAM SI JOINTS: CPT | Performed by: RADIOLOGY

## 2023-08-07 PROCEDURE — 84460 ALANINE AMINO (ALT) (SGPT): CPT | Performed by: NURSE PRACTITIONER

## 2023-08-07 PROCEDURE — 86704 HEP B CORE ANTIBODY TOTAL: CPT | Performed by: NURSE PRACTITIONER

## 2023-08-07 PROCEDURE — 86803 HEPATITIS C AB TEST: CPT | Performed by: NURSE PRACTITIONER

## 2023-08-07 PROCEDURE — 84450 TRANSFERASE (AST) (SGOT): CPT | Performed by: NURSE PRACTITIONER

## 2023-08-07 PROCEDURE — 99000 SPECIMEN HANDLING OFFICE-LAB: CPT | Performed by: NURSE PRACTITIONER

## 2023-08-07 PROCEDURE — 87340 HEPATITIS B SURFACE AG IA: CPT | Performed by: NURSE PRACTITIONER

## 2023-08-07 PROCEDURE — 86060 ANTISTREPTOLYSIN O TITER: CPT | Mod: 90 | Performed by: NURSE PRACTITIONER

## 2023-08-07 PROCEDURE — 99205 OFFICE O/P NEW HI 60 MIN: CPT | Performed by: NURSE PRACTITIONER

## 2023-08-07 PROCEDURE — 85652 RBC SED RATE AUTOMATED: CPT | Performed by: NURSE PRACTITIONER

## 2023-08-07 PROCEDURE — 86481 TB AG RESPONSE T-CELL SUSP: CPT | Performed by: NURSE PRACTITIONER

## 2023-08-07 RX ORDER — NAPROXEN 500 MG/1
500 TABLET ORAL 2 TIMES DAILY WITH MEALS
Qty: 180 TABLET | Refills: 1 | Status: SHIPPED | OUTPATIENT
Start: 2023-08-07 | End: 2023-11-02

## 2023-08-07 ASSESSMENT — PAIN SCALES - GENERAL: PAINLEVEL: MILD PAIN (3)

## 2023-08-07 NOTE — NURSING NOTE
"Jez Francis's goals for this visit include:   Chief Complaint   Patient presents with    Consult     Right knee pain, left ankle    New Patient     Referred by: Eduardo Tubbs MD       PCP: Anthony Simeon    Referring Provider:  Eduardo Chin MD  [facility]  [address] [facility]  [address]  [place] [address]      Initial /86 (BP Location: Left arm, Patient Position: Sitting, Cuff Size: Adult Large)   Pulse 90   Temp 97.7  F (36.5  C) (Oral)   Wt 106.1 kg (233 lb 14.4 oz)   SpO2 100%   BMI 27.56 kg/m   Estimated body mass index is 27.56 kg/m  as calculated from the following:    Height as of 7/6/23: 1.962 m (6' 5.25\").    Weight as of this encounter: 106.1 kg (233 lb 14.4 oz).    Medication Reconciliation: complete    Do you need any medication refills at today's visit? none    MILDRED Guo  Rheumatology/Infectious disease  [facility]   [phone]    "

## 2023-08-07 NOTE — PATIENT INSTRUCTIONS
1) R knee MRI results from [place]    2) L ankle MRI, please call [phone]     3) Stop ibuprofen now    4) Once the MRI is done start  Naproxen 500 mg twice a day    5) Labs and x-rays today    6) See me back in October

## 2023-08-07 NOTE — NURSING NOTE
CHELLE faxed to [facility] at [phone].      MILDRED Guo  Rheumatology/Infectious disease  [facility]   Rheumatology ph:[phone]  Infectious Disease ph:[phone]

## 2023-08-07 NOTE — PROGRESS NOTES
Rheumatology Clinic Visit  Cathy Carson CNP      Jez Francis  YOB: 2000    Age: 22 year old   MRN# [id number]       Date of Visit: 08/07/2023  Primary care provider: Anthony Simeon              Subjective:     Jez is a 22 year old male presents today for for consult for knee pain with hx of PSO. Referred by orthopedic Dr. Eduardo Chin. Current treatment: Ibuprofen 400 mg BID.       HPI:    Joints: Had covid 10/22. R knee started hurting 12/22, very swollen, warmer but not red, denies severe pain with TTP. There was a fall on ice, but it hurt before this. Certain position of the knee would hurt. Exercise and on even ground hurts. Stairs hurt, had to quit running.  Is worse in the am, feels stiff, needs to work it out a little. Ibuprofen, rest helps . If is active physically then worsens.   L ankle has been hurting for 4 months, is stiff in am, has not noticed swelling. Hurts with use.   Has some back stiffness for less than an hour in am.   Mom reports pt swears going up the steps in the am and seems to be very uncomfortable in am.   PSO: Eczema as kid, now rash on elbows that has been told by PCP is PSO.  Went to a doctor as a teen, and was told PSO on elbows.     ROS: Patient denies recent infections, fevers, ADILSON, weight loss, diarrhea, rashes, SOB,  new numbness or tingling. Denies red painful eyes.         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     FMH:  Sister Lupus  Dad PSO after strep infection, also had reactive arthritis. Resolved.   2nd cousin MS    Active Problem list:  Patient Active Problem List    Diagnosis Date Noted    Mixed anxiety depressive disorder 08/07/2023     Priority: Medium    Psoriasis 08/07/2023     Priority: Medium    Selective mutism 06/11/2008     Priority: Medium             Objective:     Physical Exam  Blood Pressure 133/86 (BP Location: Left arm, Patient Position: Sitting, Cuff Size: Adult Large)   Pulse 90   Temperature 97.7  F (36.5  C) (Oral)   Weight 106.1 kg (233 lb 14.4 oz)   Oxygen Saturation 100%   Body Mass Index 27.56 kg/m    Body mass index is 27.56 kg/m .    Constitutional: Normal body habitus with out gross deformities. Well groomed.   Psych: nl judgement, orientation, memory, affect.  Eyes: wnl EOM, PERRLA, vision, conjunctiva, sclera   ENT: wnl external ears, nose, hearing, lips, teeth, gums, throat. No mucous membrane lesions, normal saliva pool  Neck: no mass, thyroid enlargement, enlarged cervical lymph nodes or parotid glands  Resp: lungs clear to auscultation, nl work of breathing  CV: RRR, no murmurs, rubs or gallops, no edema  Skin: no nail pitting, alopecia, rash, nodules or lesions of exposed areas of face, neck, bilateral upper and lower extremity   Neuro: Strength WNL of bilateral upper and lower extremity large muscle groups.     MSK- (T=tender to palpation, S=swelling)  TMJ: -T/S, LROM   Cervical spine:  FROM  Shoulders: -T/S, FROM   Elbows: -*T/S, FROM   Wrists: -T/S, FROM   Hands: -T/S, FROM, Normal  strength. No dactylitis.   Hips: FROM  Knees: -T/S, FROM   Ankles: L ankle mild effusion, slight warm, heal tap causes pain, FROM No enthesopathy or tenosynovitis.   Feet: -T/S, FROM . No dactylitis.       Labs:    Lab Results   Component Value Date    CR 0.89 05/18/2017    CRP 1.8 (H) 01/12/2023      Latest Reference Range & Units 07/06/23 07:41   CRP Inflammation <5.00 mg/L 9.31 (H)      Latest Reference Range & Units 07/06/23 07:41   PATRICIO interpretation Negative  Negative      Latest Reference Range & Units 12/03/20 10:48 01/12/23 15:15 01/12/23 15:47 07/06/23 07:41   Sed Rate 0 - 15 mm/hr   16 (H) 11   Crystal Analysis No clinically significant crystals seen.   No clinically significant crystals seen.     AEROBIC BACTERIAL CULTURE ROUTINE   Rpt      ANAEROBIC BACTERIAL CULTURE ROUTINE   Rpt     Babesia Microti by PCR     Not Detected   Babesia Species by PCR     Not Detected   Chlamydia Trachomatis RNA TMA NOT DETECTED  NOT DETECTED      EHRLICHIA ANAPLASMA SP BY PCR     Rpt   CHLAMYDIA TRACHOMATIS/NEISSERIA GONORRHOEAE BY PCR  Rpt      GRAM STAIN   Rpt     Lyme Disease Antibodies Serum <0.90    0.04    Neisseria Gonorrhoeae RNA TMA NOT DETECTED  NOT DETECTED      FUNGAL OR YEAST CULTURE ROUTINE   Rpt        Latest Reference Range & Units 01/12/23 15:15   Cell Count Fluid Source  Knee, Right   RBC Fluid /uL 76,000   Total Nucleated Cells /uL 2,296   % Neutrophils Fluid % 48   % Lymphocytes Fluid % 44   % Mono/Macro Fluid % 7   % Eosinophils Fluid % 1   Color Fluid Colorless, Yellow  Red !   Appearance Fluid Clear  Cloudy !     Imaging:            Assessment and Plan:     1) R knee swelling pain and L ankle swelling pain, possible reactive arthritis vs spondyloarthropathy:  This is a 22 yr old male with question of elbow PSO as a teen who had covid 10/22, then 12/22 developed R knee swelling and pain, saw Dr. Chin at [facility] who believed this was atraumatic/ non-mechanical and mentions synovitis on R knee MRI.  R knee swelling has improved over time but continues to hurt and is stiff in am. Now last 4 months has had similar symptoms in L ankle.   Exam shows mild effusion and TTP of L ankle.   Lab eval shows abnormal CRP 9.31 and synovial fluid slight inflammatory with nucleated cells 2,296.   Labs normal/ negative GC, Lyme, RF, CCP, CBC, PATRICIO, synovial fluid 1/23 negative for crystals, cx's negative.     Discussion: Will obtain further lab eval, L ankle MRI, treat for reactive arthritis, pending MRI results, spondyloarthropathy remains in the diff dx. Will start Naproxen 500 mg BID.     Pt instructions:  1) R knee MRI results from [facility]    2) L ankle MRI     3) Stop ibuprofen now    4) Once the MRI is done start  Naproxen 500 mg twice a day    5)  Labs today    6) See me back in October   Pt states understanding and agreement to plan of care, has no further questions.   64 minute spent on the date of the encounter doing chart review, history and exam, documentation and further activities per the note      Cathy Carson CNP  Rheumatology, [facility]

## 2023-08-08 LAB
DEPRECATED CALCIDIOL+CALCIFEROL SERPL-MC: 23 UG/L (ref 20–75)
GAMMA INTERFERON BACKGROUND BLD IA-ACNC: 0.03 IU/ML
HBV CORE AB SERPL QL IA: NONREACTIVE
HBV SURFACE AG SERPL QL IA: NONREACTIVE
HCV AB SERPL QL IA: NONREACTIVE
M TB IFN-G BLD-IMP: NEGATIVE
M TB IFN-G CD4+ BCKGRND COR BLD-ACNC: 6.1 IU/ML
MITOGEN IGNF BCKGRD COR BLD-ACNC: 0 IU/ML
MITOGEN IGNF BCKGRD COR BLD-ACNC: 0.02 IU/ML
QUANTIFERON MITOGEN: 6.13 IU/ML
QUANTIFERON NIL TUBE: 0.03 IU/ML
QUANTIFERON TB1 TUBE: 0.05 IU/ML
QUANTIFERON TB2 TUBE: 0.03

## 2023-08-09 LAB
ASO AB SERPL-ACNC: 55 IU/ML
STREP DNASE B SER-ACNC: <86 U/ML

## 2023-08-14 ENCOUNTER — TELEPHONE (OUTPATIENT)
Dept: RHEUMATOLOGY | Facility: CLINIC | Age: 23
End: 2023-08-14

## 2023-08-14 NOTE — TELEPHONE ENCOUNTER
Good Morning,    This is a peer to peer request for MR Ankle Left w/o & w Contrast procedure. The insurance is requesting this to better understand the reason why the test is being ordered. This peer to peer needs to be completed on or before 8/21/2023. Patient is scheduled today 8/14/2023 in the late afternoon.    Please call the insurance company and reference the following information:  Payor phone number: [phone]  Case number: [id number]  Patient ID: [id number]   Reason why it was denied:     Requesting response of outcome back to FC's on approval/denial with the following information:   auth#  date range  who they spoke to     If you have any further questions please feel free to reach out to me. Thank you for your attention on this.         Fannie Fletcher    Financial Counselor  [id number] [address]  Phone- [phone]  Fax- [phone]

## 2023-08-16 LAB
B LOCUS: NORMAL
B27TEST METHOD: NORMAL

## 2023-08-18 ENCOUNTER — ANCILLARY PROCEDURE (OUTPATIENT)
Dept: MRI IMAGING | Facility: CLINIC | Age: 23
End: 2023-08-18
Attending: NURSE PRACTITIONER
Payer: COMMERCIAL

## 2023-08-18 DIAGNOSIS — M25.472 LEFT ANKLE SWELLING: ICD-10-CM

## 2023-08-18 PROCEDURE — 73723 MRI JOINT LWR EXTR W/O&W/DYE: CPT | Mod: LT | Performed by: RADIOLOGY

## 2023-08-18 PROCEDURE — A9585 GADOBUTROL INJECTION: HCPCS | Mod: JZ | Performed by: RADIOLOGY

## 2023-08-18 RX ORDER — GADOBUTROL 604.72 MG/ML
10 INJECTION INTRAVENOUS ONCE
Status: COMPLETED | OUTPATIENT
Start: 2023-08-18 | End: 2023-08-18

## 2023-08-18 RX ADMIN — GADOBUTROL 10 ML: 604.72 INJECTION INTRAVENOUS at 14:27

## 2023-11-02 ENCOUNTER — OFFICE VISIT (OUTPATIENT)
Dept: RHEUMATOLOGY | Facility: CLINIC | Age: 23
End: 2023-11-02
Payer: COMMERCIAL

## 2023-11-02 VITALS
OXYGEN SATURATION: 97 % | DIASTOLIC BLOOD PRESSURE: 77 MMHG | WEIGHT: 237.5 LBS | SYSTOLIC BLOOD PRESSURE: 146 MMHG | HEART RATE: 111 BPM | BODY MASS INDEX: 27.98 KG/M2 | TEMPERATURE: 98.2 F

## 2023-11-02 DIAGNOSIS — M02.39 REACTIVE ARTHRITIS OF MULTIPLE SITES (H): ICD-10-CM

## 2023-11-02 DIAGNOSIS — L40.50 PSORIATIC ARTHRITIS (H): Primary | ICD-10-CM

## 2023-11-02 LAB — N GONORRHOEA DNA SPEC QL NAA+PROBE: NEGATIVE

## 2023-11-02 PROCEDURE — 99214 OFFICE O/P EST MOD 30 MIN: CPT | Performed by: NURSE PRACTITIONER

## 2023-11-02 PROCEDURE — 87591 N.GONORRHOEAE DNA AMP PROB: CPT | Performed by: NURSE PRACTITIONER

## 2023-11-02 RX ORDER — SULFASALAZINE 500 MG/1
1000 TABLET, DELAYED RELEASE ORAL 2 TIMES DAILY
Qty: 360 TABLET | Refills: 3 | Status: SHIPPED | OUTPATIENT
Start: 2023-11-02 | End: 2024-03-27

## 2023-11-02 RX ORDER — NAPROXEN 500 MG/1
500 TABLET ORAL 2 TIMES DAILY WITH MEALS
Qty: 180 TABLET | Refills: 1 | Status: SHIPPED | OUTPATIENT
Start: 2023-11-02

## 2023-11-02 ASSESSMENT — PAIN SCALES - GENERAL: PAINLEVEL: MILD PAIN (3)

## 2023-11-02 NOTE — PROGRESS NOTES
Rheumatology Clinic Visit  Cathy Carson, JODI      Jez Francis  YOB: 2000    Age: 23 year old   MRN# [id number]       Date of Visit: 11/02/2023  Primary care provider: Anthony Simeon              Subjective:     Jez is a 23 year old male presents today for for consult for knee pain with hx of PSO. Referred by orthopedic Dr. Eduardo Chin. Current treatment: Naproxen 500 mg BID (8/23).     11/02/2023:  Tolerating naproxen. %70 improved with pain and swelling of R knee L ankle. Scant active PSO on elbows not bothersome.     Consult 8/07/2023 HPI:    Joints: Had covid 10/22. R knee started hurting 12/22, very swollen, warmer but not red, denies severe pain with TTP. There was a fall on ice, but it hurt before this. Certain position of the knee would hurt. Exercise and on even ground hurts. Stairs hurt, had to quit running.  Is worse in the am, feels stiff, needs to work it out a little. Ibuprofen, rest helps . If is active physically then worsens.   L ankle has been hurting for 4 months, is stiff in am, has not noticed swelling. Hurts with use.   Has some back stiffness for less than an hour in am.   Mom reports pt swears going up the steps in the am and seems to be very uncomfortable in am.   PSO: Eczema as kid, now rash on elbows that has been told by PCP is PSO.  Went to a doctor as a teen, and was told PSO on elbows.     ROS: Patient denies recent infections, fevers, ADILSON, weight loss, diarrhea, rashes, SOB,  new numbness or tingling. Denies red painful eyes.         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     FMH:  Sister Lupus  Dad PSO after strep infection, also had reactive arthritis. Resolved.   2nd cousin MS    Active Problem list:  Patient Active Problem List    Diagnosis Date Noted    Mixed anxiety depressive  disorder 08/07/2023     Priority: Medium    Psoriasis 08/07/2023     Priority: Medium    Selective mutism 06/11/2008     Priority: Medium            Objective:     Physical Exam  BP (!) 146/77 (BP Location: Left arm, Patient Position: Sitting, Cuff Size: Adult Large)   Pulse 111   Temp 98.2  F (36.8  C) (Oral)   Wt 107.7 kg (237 lb 8 oz)   SpO2 97%   BMI 27.98 kg/m    Body mass index is 27.98 kg/m .    Constitutional: Normal body habitus with out gross deformities. Well groomed.   Psych: nl judgement, orientation, memory, affect.  Eyes: wnl EOM, vision, conjunctiva, sclera       MSK- (T=tender to palpation, S=swelling)    Knees: R trace effusion.   Ankles: L ankle improved, trace effusion   Feet: -T/S, FROM . No dactylitis.       Labs:    Lab Results   Component Value Date    ALT 40 08/07/2023    AST 25 08/07/2023    CR 0.78 08/07/2023    CRP 1.8 (H) 01/12/2023      Latest Reference Range & Units 07/06/23 07:41   CRP Inflammation <5.00 mg/L 9.31 (H)      Latest Reference Range & Units 07/06/23 07:41   PATRICIO interpretation Negative  Negative      Latest Reference Range & Units 12/03/20 10:48 01/12/23 15:15 01/12/23 15:47 07/06/23 07:41   Sed Rate 0 - 15 mm/hr   16 (H) 11   Crystal Analysis No clinically significant crystals seen.   No clinically significant crystals seen.     AEROBIC BACTERIAL CULTURE ROUTINE   Rpt     ANAEROBIC BACTERIAL CULTURE ROUTINE   Rpt     Babesia Microti by PCR     Not Detected   Babesia Species by PCR     Not Detected   Chlamydia Trachomatis RNA TMA NOT DETECTED  NOT DETECTED      EHRLICHIA ANAPLASMA SP BY PCR     Rpt   CHLAMYDIA TRACHOMATIS/NEISSERIA GONORRHOEAE BY PCR  Rpt      GRAM STAIN   Rpt     Lyme Disease Antibodies Serum <0.90    0.04    Neisseria Gonorrhoeae RNA TMA NOT DETECTED  NOT DETECTED      FUNGAL OR YEAST CULTURE ROUTINE   Rpt        Latest Reference Range & Units 01/12/23 15:15   Cell Count Fluid Source  Knee, Right   RBC Fluid /uL 76,000   Total Nucleated Cells /uL  2,296   % Neutrophils Fluid % 48   % Lymphocytes Fluid % 44   % Mono/Macro Fluid % 7   % Eosinophils Fluid % 1   Color Fluid Colorless, Yellow  Red !   Appearance Fluid Clear  Cloudy !      Latest Reference Range & Units 08/07/23 10:34   Quantiferon-TB Gold Plus Result Negative  Negative   TB1 Ag minus Nil Value IU/mL 0.02   TB2 Ag minus Nil Value IU/mL 0.00   Mitogen minus Nil Result IU/mL 6.10   Nil Result IU/mL 0.03   QUANTIFERON-TB GOLD PLUS  Rpt   Hep B Surface Agn Nonreactive  Nonreactive   Hepatitis B Core Namita Nonreactive  Nonreactive   Hepatitis C Antibody Nonreactive  Nonreactive   B locus  B27 Neg   [id number] Method  SSOP     Imaging:   Narrative & Impression   MR  left   ankle without  contrast 8/18/2023 3:35 PM       Impression:     1. Significant synovial enhancement throughout the medial, lateral and  posterior ankle including the medial flexor and extensor tendon tendon  sheath, as best seen on the subtraction images. These findings are  nonspecific and suggest reactive inflammatory versus infectious  etiology, such as rheumatoid, psoriasis, crystal deposition disease.  Infectious causes cannot be excluded.   2. Mild reactive periostitis with edema and enhancement at the  superior and lateral margins of the calcaneus.  3. Moderate joint effusion.     JUTTA ELLERMANN, MD     MRI R Knee 01/09/2023 [facility]: Showed effusion and synovitis.          Assessment and Plan:     1) PSA/PSO:  At time of consult 22 yr old male with previous dx of PSO as a teen. Pt reports had covid 10/22, then 12/22 developed R knee swelling and pain, saw Dr. Chin at [facility] had R knee MRI that showed effusion and synovitis.  R knee swelling had improved over time but continued to hurt and stiff in am. Spring 2023 developed similar symptoms in L ankle with pain, swelling stiffness that is worse in am. Exam at consult showed mild effusion and TTP of L ankle.   Lab eval shows abnormal CRP 9.31 and synovial fluid slight  inflammatory with nucleated cells 2,296.  L ankle MRI 8/18/2023    1. Significant synovial enhancement throughout the medial, lateral and  posterior ankle including the medial flexor and extensor tendon tendon  sheath, as best seen on the subtraction images. These findings are  nonspecific and suggest reactive inflammatory versus infectious  etiology, such as rheumatoid, psoriasis, crystal deposition disease.  Infectious causes cannot be excluded.   2. Mild reactive periostitis with edema and enhancement at the  superior and lateral margins of the calcaneus.  3. Moderate joint effusion.  Labs normal/ negative GC, Lyme, RF, CCP, CBC, PATRICIO, synovial fluid 1/23 negative for crystals, cx's negative.     Discussion: Diff dx of PSA vs reactive arthritis, given hx of PSO most likely represents PSA however given onset post covid infection will continue to consider reactive arthritis. %70 improvement on Naproxen 500 mg BID, will add SSZ and attempt to taper off of Naproxen, if worsens or fails to taper of NSIAD will transition to Methotrexate. Of note ETOH now on weekends, previously daily, pt reports able to decreased for medication if needed.     Pt instructions:    1) Start Sulfasalazine 500 mg tabs, goal dose of 1,000 mg (2 tabs) twice a day  Start with 1 tab a day and add a tab every 4-7 days until taking 2 tabs twice a day     2) Once you are on full dose sulfasalazine decreased naproxen to 500 mg daily for 2 weeks, then consider tapering off and taking as needed if doing well.     3) Labs in 6 weeks (make appt at [place] location best for you)    4) Urine lab test today    5) See me back in 3 months     Pt states understanding and agreement to plan of care, has no further questions.     36 minute spent on the date of the encounter doing chart review, history and exam, documentation and further activities per the note      Cathy Carson, CNP  Rheumatology, [facility]

## 2023-11-02 NOTE — NURSING NOTE
"Jez Francis's goals for this visit include:   Chief Complaint   Patient presents with    RECHECK     Chronic low back pain without sciatica, unspecified back pain laterality  Reactive arthritis of multiple sites         PCP: Anthony Simeon    Referring Provider:  No referring provider defined for this encounter.      Initial BP (!) 146/77 (BP Location: Left arm, Patient Position: Sitting, Cuff Size: Adult Large)   Pulse 111   Temp 98.2  F (36.8  C) (Oral)   Wt 107.7 kg (237 lb 8 oz)   SpO2 97%   BMI 27.98 kg/m   Estimated body mass index is 27.98 kg/m  as calculated from the following:    Height as of 7/6/23: 1.962 m (6' 5.25\").    Weight as of this encounter: 107.7 kg (237 lb 8 oz).    Medication Reconciliation: complete    Do you need any medication refills at today's visit? Yes    MILDRED Guo  Rheumatology/Infectious disease  [facility]   [phone]      "

## 2023-11-02 NOTE — PATIENT INSTRUCTIONS
1) Start Sulfasalazine 500 mg tabs, goal dose of 1,000 mg (2 tabs) twice a day  Start with 1 tab a day and add a tab every 4-7 days until taking 2 tabs twice a day     2) Once you are on full dose sulfasalazine decreased naproxen to 500 mg daily for 2 weeks, then consider tapering off and taking as needed if doing well.     3) Labs in 6 weeks (make appt at [place] location best for you)    4) Urine lab test today    5) See me back in 3 months

## 2023-12-22 ENCOUNTER — LAB (OUTPATIENT)
Dept: LAB | Facility: CLINIC | Age: 23
End: 2023-12-22
Payer: COMMERCIAL

## 2023-12-22 DIAGNOSIS — L40.50 PSORIATIC ARTHRITIS (H): ICD-10-CM

## 2023-12-22 LAB
ALBUMIN SERPL BCG-MCNC: 4.4 G/DL (ref 3.5–5.2)
ALT SERPL W P-5'-P-CCNC: 43 U/L (ref 0–70)
AST SERPL W P-5'-P-CCNC: 26 U/L (ref 0–45)
CREAT SERPL-MCNC: 0.87 MG/DL (ref 0.67–1.17)
CRP SERPL-MCNC: <3 MG/L
EGFRCR SERPLBLD CKD-EPI 2021: >90 ML/MIN/1.73M2
ERYTHROCYTE [DISTWIDTH] IN BLOOD BY AUTOMATED COUNT: 12.1 % (ref 10–15)
ERYTHROCYTE [SEDIMENTATION RATE] IN BLOOD BY WESTERGREN METHOD: 4 MM/HR (ref 0–15)
HCT VFR BLD AUTO: 44.9 % (ref 40–53)
HGB BLD-MCNC: 15 G/DL (ref 13.3–17.7)
MCH RBC QN AUTO: 28.4 PG (ref 26.5–33)
MCHC RBC AUTO-ENTMCNC: 33.4 G/DL (ref 31.5–36.5)
MCV RBC AUTO: 85 FL (ref 78–100)
PLATELET # BLD AUTO: 233 10E3/UL (ref 150–450)
RBC # BLD AUTO: 5.28 10E6/UL (ref 4.4–5.9)
WBC # BLD AUTO: 4.7 10E3/UL (ref 4–11)

## 2023-12-22 PROCEDURE — 84450 TRANSFERASE (AST) (SGOT): CPT

## 2023-12-22 PROCEDURE — 84460 ALANINE AMINO (ALT) (SGPT): CPT

## 2023-12-22 PROCEDURE — 36415 COLL VENOUS BLD VENIPUNCTURE: CPT

## 2023-12-22 PROCEDURE — 82565 ASSAY OF CREATININE: CPT

## 2023-12-22 PROCEDURE — 85652 RBC SED RATE AUTOMATED: CPT

## 2023-12-22 PROCEDURE — 85027 COMPLETE CBC AUTOMATED: CPT

## 2023-12-22 PROCEDURE — 86140 C-REACTIVE PROTEIN: CPT

## 2023-12-22 PROCEDURE — 82040 ASSAY OF SERUM ALBUMIN: CPT

## 2024-02-12 ENCOUNTER — OFFICE VISIT (OUTPATIENT)
Dept: RHEUMATOLOGY | Facility: CLINIC | Age: 24
End: 2024-02-12
Payer: COMMERCIAL

## 2024-02-12 VITALS
WEIGHT: 242.7 LBS | OXYGEN SATURATION: 96 % | SYSTOLIC BLOOD PRESSURE: 120 MMHG | HEART RATE: 105 BPM | BODY MASS INDEX: 28.59 KG/M2 | DIASTOLIC BLOOD PRESSURE: 81 MMHG | TEMPERATURE: 97.5 F

## 2024-02-12 DIAGNOSIS — L40.50 PSORIATIC ARTHRITIS (H): Primary | ICD-10-CM

## 2024-02-12 LAB
ALBUMIN SERPL BCG-MCNC: 4.9 G/DL (ref 3.5–5.2)
ALT SERPL W P-5'-P-CCNC: 123 U/L (ref 0–70)
AST SERPL W P-5'-P-CCNC: 55 U/L (ref 0–45)
CREAT SERPL-MCNC: 0.81 MG/DL (ref 0.67–1.17)
CRP SERPL-MCNC: <3 MG/L
EGFRCR SERPLBLD CKD-EPI 2021: >90 ML/MIN/1.73M2
ERYTHROCYTE [DISTWIDTH] IN BLOOD BY AUTOMATED COUNT: 12.1 % (ref 10–15)
ERYTHROCYTE [SEDIMENTATION RATE] IN BLOOD BY WESTERGREN METHOD: 1 MM/HR (ref 0–15)
HCT VFR BLD AUTO: 45.7 % (ref 40–53)
HGB BLD-MCNC: 15.4 G/DL (ref 13.3–17.7)
MCH RBC QN AUTO: 28.9 PG (ref 26.5–33)
MCHC RBC AUTO-ENTMCNC: 33.7 G/DL (ref 31.5–36.5)
MCV RBC AUTO: 86 FL (ref 78–100)
PLATELET # BLD AUTO: 249 10E3/UL (ref 150–450)
RBC # BLD AUTO: 5.32 10E6/UL (ref 4.4–5.9)
WBC # BLD AUTO: 6.7 10E3/UL (ref 4–11)

## 2024-02-12 PROCEDURE — 99214 OFFICE O/P EST MOD 30 MIN: CPT | Performed by: NURSE PRACTITIONER

## 2024-02-12 PROCEDURE — 84450 TRANSFERASE (AST) (SGOT): CPT | Performed by: NURSE PRACTITIONER

## 2024-02-12 PROCEDURE — 86140 C-REACTIVE PROTEIN: CPT | Performed by: NURSE PRACTITIONER

## 2024-02-12 PROCEDURE — 84460 ALANINE AMINO (ALT) (SGPT): CPT | Performed by: NURSE PRACTITIONER

## 2024-02-12 PROCEDURE — 82565 ASSAY OF CREATININE: CPT | Performed by: NURSE PRACTITIONER

## 2024-02-12 PROCEDURE — 36415 COLL VENOUS BLD VENIPUNCTURE: CPT | Performed by: NURSE PRACTITIONER

## 2024-02-12 PROCEDURE — 82040 ASSAY OF SERUM ALBUMIN: CPT | Performed by: NURSE PRACTITIONER

## 2024-02-12 PROCEDURE — 85027 COMPLETE CBC AUTOMATED: CPT | Performed by: NURSE PRACTITIONER

## 2024-02-12 PROCEDURE — 85652 RBC SED RATE AUTOMATED: CPT | Performed by: NURSE PRACTITIONER

## 2024-02-12 ASSESSMENT — PAIN SCALES - GENERAL: PAINLEVEL: NO PAIN (1)

## 2024-02-12 NOTE — PATIENT INSTRUCTIONS
1) Continue sulfasalazine 2 tabs twice a day     2) Naproxen 500 mg 1-2 tabs a day if needed    3) Labs today and every 3 months    4) See me back in 4 months

## 2024-02-12 NOTE — PROGRESS NOTES
Rheumatology Clinic Visit  Cathy Carson CNP      Jez Francis  YOB: 2000    Age: 23 year old   MRN# [id number]       Date of Visit: 02/12/2024  Primary care provider: Anthony Simeon              Subjective:     Jez is a 23 year old male presents today for follow-up for PSA/PSO (DX 2023).  Current treatment: SSZ 1 gm BID (11/23), Naproxen 500 mg BID PRN (8/23).     2/12/24:   PSA: Over all 90-95% improved. L ankle is good. R knee is good most of the time. Has little flare ups will have a little pain. A little stiff in am. No swelling. Has weaned off of naproxen   PSO: Skin doing well.   ROS: No infections, fevers, SOB. Denies eye redness, pain  or visual changes.     11/02/2023:  Tolerating naproxen. %70 improved with pain and swelling of R knee L ankle. Scant active PSO on elbows not bothersome.     Consult 8/07/2023 HPI:    Joints: Had covid 10/22. R knee started hurting 12/22, very swollen, warmer but not red, denies severe pain with TTP. There was a fall on ice, but it hurt before this. Certain position of the knee would hurt. Exercise and on even ground hurts. Stairs hurt, had to quit running.  Is worse in the am, feels stiff, needs to work it out a little. Ibuprofen, rest helps . If is active physically then worsens.   L ankle has been hurting for 4 months, is stiff in am, has not noticed swelling. Hurts with use.   Has some back stiffness for less than an hour in am.   Mom reports pt swears going up the steps in the am and seems to be very uncomfortable in am.   PSO: Eczema as kid, now rash on elbows that has been told by PCP is PSO.  Went to a doctor as a teen, and was told PSO on elbows.     ROS: Patient denies recent infections, fevers, ADILSON, weight loss, diarrhea, rashes, SOB,  new numbness or tingling. Denies red painful eyes.         Past Rheum tx:      Social History  Lives in [place] of [place] with family,  Farming, school internship mechanical  engineering  +tobacco  +ETOH, on weekends  No exercise past runner had to stop due to symptoms.     FMH:  Sister Lupus  Dad PSO after strep infection, also had reactive arthritis. Resolved.   2nd cousin MS    Active Problem list:  Patient Active Problem List    Diagnosis Date Noted    Psoriatic arthritis (H) 11/02/2023     Priority: Medium    Mixed anxiety depressive disorder 08/07/2023     Priority: Medium    Psoriasis 08/07/2023     Priority: Medium    Selective mutism 06/11/2008     Priority: Medium            Objective:     Physical Exam  /81 (BP Location: Left arm, Patient Position: Sitting, Cuff Size: Adult Large)   Pulse 105   Temp 97.5  F (36.4  C) (Oral)   Wt 110.1 kg (242 lb 11.2 oz)   SpO2 96%   BMI 28.59 kg/m    Body mass index is 28.59 kg/m .    Constitutional: Normal body habitus with out gross deformities. Well groomed.   Psych: nl judgement, orientation, memory, affect.  Eyes: wnl EOM,  vision, conjunctiva, sclera     Resp: nl work of breathing  CV:  no edema    Skin: no nail pitting, alopecia, rash, nodules or lesions of exposed areas of face, neck, bilateral upper and lower extremity. Facial pustular lesions resembling acne.    Neuro: Strength WNL of bilateral upper and lower extremity large muscle groups.     MSK- (T=tender to palpation, S=swelling)  TMJ: -T/S, FROM   Cervical spine:  FROM  Shoulders: -T/S, FROM   Elbows: -T/S, FROM   Wrists: -T/S, FROM   Hands: -T/S, FROM, Normal  strength. No dactylitis.   Hips: FROM  Knees: -T/S, FROM , slow ROM  Ankles: -T/S, FROM No enthesopathy or tenosynovitis.   Feet: -T/S, FROM . No dactylitis.         Labs:    Lab Results   Component Value Date    ALT 43 12/22/2023    AST 26 12/22/2023    CR 0.87 12/22/2023    CRP 1.8 (H) 01/12/2023      Latest Reference Range & Units 07/06/23 07:41   CRP Inflammation <5.00 mg/L 9.31 (H)      Latest Reference Range & Units 07/06/23 07:41   PATRICIO interpretation Negative  Negative      Latest Reference Range &  Units 12/03/20 10:48 01/12/23 15:15 01/12/23 15:47 07/06/23 07:41   Sed Rate 0 - 15 mm/hr   16 (H) 11   Crystal Analysis No clinically significant crystals seen.   No clinically significant crystals seen.     AEROBIC BACTERIAL CULTURE ROUTINE   Rpt     ANAEROBIC BACTERIAL CULTURE ROUTINE   Rpt     Babesia Microti by PCR     Not Detected   Babesia Species by PCR     Not Detected   Chlamydia Trachomatis RNA TMA NOT DETECTED  NOT DETECTED      EHRLICHIA ANAPLASMA SP BY PCR     Rpt   CHLAMYDIA TRACHOMATIS/NEISSERIA GONORRHOEAE BY PCR  Rpt      GRAM STAIN   Rpt     Lyme Disease Antibodies Serum <0.90    0.04    Neisseria Gonorrhoeae RNA TMA NOT DETECTED  NOT DETECTED      FUNGAL OR YEAST CULTURE ROUTINE   Rpt        Latest Reference Range & Units 01/12/23 15:15   Cell Count Fluid Source  Knee, Right   RBC Fluid /uL 76,000   Total Nucleated Cells /uL 2,296   % Neutrophils Fluid % 48   % Lymphocytes Fluid % 44   % Mono/Macro Fluid % 7   % Eosinophils Fluid % 1   Color Fluid Colorless, Yellow  Red !   Appearance Fluid Clear  Cloudy !      Latest Reference Range & Units 08/07/23 10:34   Quantiferon-TB Gold Plus Result Negative  Negative   TB1 Ag minus Nil Value IU/mL 0.02   TB2 Ag minus Nil Value IU/mL 0.00   Mitogen minus Nil Result IU/mL 6.10   Nil Result IU/mL 0.03   QUANTIFERON-TB GOLD PLUS  Rpt   Hep B Surface Agn Nonreactive  Nonreactive   Hepatitis B Core Namita Nonreactive  Nonreactive   Hepatitis C Antibody Nonreactive  Nonreactive   B locus  B27 Neg   [id number] Method  SSOP     Imaging:   Narrative & Impression   MR  left   ankle without  contrast 8/18/2023 3:35 PM       Impression:     1. Significant synovial enhancement throughout the medial, lateral and  posterior ankle including the medial flexor and extensor tendon tendon  sheath, as best seen on the subtraction images. These findings are  nonspecific and suggest reactive inflammatory versus infectious  etiology, such as rheumatoid, psoriasis, crystal deposition  disease.  Infectious causes cannot be excluded.   2. Mild reactive periostitis with edema and enhancement at the  superior and lateral margins of the calcaneus.  3. Moderate joint effusion.     JUTTA ELLERMANN, MD     MRI R Knee 01/09/2023 [place]: Showed effusion and synovitis.          Assessment and Plan:     1) PSA/PSO:  At time of consult 22 yr old male with previous dx of PSO as a teen. Pt reports had covid 10/22, then 12/22 developed R knee swelling and pain, saw Dr. Chin at [facility] had R knee MRI that showed effusion and synovitis.  R knee swelling had improved over time but continued to hurt and stiff in am. Spring 2023 developed similar symptoms in L ankle with pain, swelling stiffness that is worse in am. Exam at consult showed mild effusion and TTP of L ankle.   Lab eval shows abnormal CRP 9.31 and synovial fluid slight inflammatory with nucleated cells 2,296.  L ankle MRI 8/18/2023    1. Significant synovial enhancement throughout the medial, lateral and  posterior ankle including the medial flexor and extensor tendon tendon  sheath, as best seen on the subtraction images. These findings are  nonspecific and suggest reactive inflammatory versus infectious  etiology, such as rheumatoid, psoriasis, crystal deposition disease.  Infectious causes cannot be excluded.   2. Mild reactive periostitis with edema and enhancement at the  superior and lateral margins of the calcaneus.  3. Moderate joint effusion.  Labs normal/ negative GC, Lyme, RF, CCP, CBC, PATRICIO, synovial fluid 1/23 negative for crystals, cx's negative.     Discussion: Diff dx of PSA vs reactive arthritis, given hx of PSO most likely represents PSA however given onset post covid infection will continue to consider reactive arthritis. %70 improvement on Naproxen 500 mg BID, now on SSZ with 90% improvement and transitioned off of naproxen. Toxicity labs updated and WNL, CRP now normal. Doing very well. Continue current tx regimen.     Pt  instructions:    1) Continue sulfasalazine 2 tabs twice a day     2) Naproxen 500 mg 1-2 tabs a day if needed    3) Labs today and every 3 months    4) See me back in 4 months     Pt states understanding and agreement to plan of care, has no further questions.         Cathy Carson CNP  Rheumatology, [facility]      Addendum 3/25/24:  Elevated LFTs, stop SSZ.   Repeat LFTs in 4 weeks, see pt back and consider starting a TNFi  Cathy Carson, NP

## 2024-03-08 ENCOUNTER — MYC REFILL (OUTPATIENT)
Dept: RHEUMATOLOGY | Facility: CLINIC | Age: 24
End: 2024-03-08
Payer: COMMERCIAL

## 2024-03-08 DIAGNOSIS — L40.50 PSORIATIC ARTHRITIS (H): ICD-10-CM

## 2024-03-08 RX ORDER — SULFASALAZINE 500 MG/1
1000 TABLET, DELAYED RELEASE ORAL 2 TIMES DAILY
Qty: 360 TABLET | Refills: 3 | Status: CANCELLED | OUTPATIENT
Start: 2024-03-08

## 2024-03-10 ENCOUNTER — HEALTH MAINTENANCE LETTER (OUTPATIENT)
Age: 24
End: 2024-03-10

## 2024-03-21 ENCOUNTER — LAB (OUTPATIENT)
Dept: LAB | Facility: CLINIC | Age: 24
End: 2024-03-21
Payer: COMMERCIAL

## 2024-03-21 DIAGNOSIS — L40.50 PSORIATIC ARTHRITIS (H): ICD-10-CM

## 2024-03-21 LAB
ERYTHROCYTE [DISTWIDTH] IN BLOOD BY AUTOMATED COUNT: 11.8 % (ref 10–15)
ERYTHROCYTE [SEDIMENTATION RATE] IN BLOOD BY WESTERGREN METHOD: 5 MM/HR (ref 0–15)
HCT VFR BLD AUTO: 43.6 % (ref 40–53)
HGB BLD-MCNC: 14.8 G/DL (ref 13.3–17.7)
MCH RBC QN AUTO: 29 PG (ref 26.5–33)
MCHC RBC AUTO-ENTMCNC: 33.9 G/DL (ref 31.5–36.5)
MCV RBC AUTO: 85 FL (ref 78–100)
PLATELET # BLD AUTO: 254 10E3/UL (ref 150–450)
RBC # BLD AUTO: 5.11 10E6/UL (ref 4.4–5.9)
WBC # BLD AUTO: 5.7 10E3/UL (ref 4–11)

## 2024-03-21 PROCEDURE — 82565 ASSAY OF CREATININE: CPT

## 2024-03-21 PROCEDURE — 86140 C-REACTIVE PROTEIN: CPT

## 2024-03-21 PROCEDURE — 85027 COMPLETE CBC AUTOMATED: CPT

## 2024-03-21 PROCEDURE — 84450 TRANSFERASE (AST) (SGOT): CPT

## 2024-03-21 PROCEDURE — 84460 ALANINE AMINO (ALT) (SGPT): CPT

## 2024-03-21 PROCEDURE — 36415 COLL VENOUS BLD VENIPUNCTURE: CPT

## 2024-03-21 PROCEDURE — 82040 ASSAY OF SERUM ALBUMIN: CPT

## 2024-03-21 PROCEDURE — 85652 RBC SED RATE AUTOMATED: CPT

## 2024-03-22 LAB
ALBUMIN SERPL BCG-MCNC: 4.6 G/DL (ref 3.5–5.2)
ALT SERPL W P-5'-P-CCNC: 153 U/L (ref 0–70)
AST SERPL W P-5'-P-CCNC: 54 U/L (ref 0–45)
CREAT SERPL-MCNC: 0.86 MG/DL (ref 0.67–1.17)
CRP SERPL-MCNC: <3 MG/L
EGFRCR SERPLBLD CKD-EPI 2021: >90 ML/MIN/1.73M2

## 2024-05-09 ENCOUNTER — LAB (OUTPATIENT)
Dept: LAB | Facility: CLINIC | Age: 24
End: 2024-05-09
Payer: COMMERCIAL

## 2024-05-09 DIAGNOSIS — L40.50 PSORIATIC ARTHRITIS (H): ICD-10-CM

## 2024-05-09 LAB
ERYTHROCYTE [DISTWIDTH] IN BLOOD BY AUTOMATED COUNT: 11.5 % (ref 10–15)
ERYTHROCYTE [SEDIMENTATION RATE] IN BLOOD BY WESTERGREN METHOD: 6 MM/HR (ref 0–15)
HCT VFR BLD AUTO: 44.7 % (ref 40–53)
HGB BLD-MCNC: 14.9 G/DL (ref 13.3–17.7)
MCH RBC QN AUTO: 28.1 PG (ref 26.5–33)
MCHC RBC AUTO-ENTMCNC: 33.3 G/DL (ref 31.5–36.5)
MCV RBC AUTO: 84 FL (ref 78–100)
PLATELET # BLD AUTO: 263 10E3/UL (ref 150–450)
RBC # BLD AUTO: 5.31 10E6/UL (ref 4.4–5.9)
WBC # BLD AUTO: 4.9 10E3/UL (ref 4–11)

## 2024-05-09 PROCEDURE — 82040 ASSAY OF SERUM ALBUMIN: CPT

## 2024-05-09 PROCEDURE — 82565 ASSAY OF CREATININE: CPT

## 2024-05-09 PROCEDURE — 84460 ALANINE AMINO (ALT) (SGPT): CPT

## 2024-05-09 PROCEDURE — 36415 COLL VENOUS BLD VENIPUNCTURE: CPT

## 2024-05-09 PROCEDURE — 85652 RBC SED RATE AUTOMATED: CPT

## 2024-05-09 PROCEDURE — 86140 C-REACTIVE PROTEIN: CPT

## 2024-05-09 PROCEDURE — 85027 COMPLETE CBC AUTOMATED: CPT

## 2024-05-09 PROCEDURE — 84450 TRANSFERASE (AST) (SGOT): CPT

## 2024-05-10 LAB
ALBUMIN SERPL BCG-MCNC: 4.9 G/DL (ref 3.5–5.2)
ALT SERPL W P-5'-P-CCNC: 85 U/L (ref 0–70)
AST SERPL W P-5'-P-CCNC: 40 U/L (ref 0–45)
CREAT SERPL-MCNC: 0.89 MG/DL (ref 0.67–1.17)
CRP SERPL-MCNC: <3 MG/L
EGFRCR SERPLBLD CKD-EPI 2021: >90 ML/MIN/1.73M2

## 2024-06-03 ENCOUNTER — LAB (OUTPATIENT)
Dept: LAB | Facility: CLINIC | Age: 24
End: 2024-06-03
Payer: COMMERCIAL

## 2024-06-03 DIAGNOSIS — L40.50 PSORIATIC ARTHRITIS (H): ICD-10-CM

## 2024-06-03 LAB
ALBUMIN SERPL BCG-MCNC: 4.7 G/DL (ref 3.5–5.2)
ALT SERPL W P-5'-P-CCNC: 46 U/L (ref 0–70)
AST SERPL W P-5'-P-CCNC: 27 U/L (ref 0–45)
CREAT SERPL-MCNC: 0.88 MG/DL (ref 0.67–1.17)
CRP SERPL-MCNC: <3 MG/L
EGFRCR SERPLBLD CKD-EPI 2021: >90 ML/MIN/1.73M2
ERYTHROCYTE [DISTWIDTH] IN BLOOD BY AUTOMATED COUNT: 11.6 % (ref 10–15)
ERYTHROCYTE [SEDIMENTATION RATE] IN BLOOD BY WESTERGREN METHOD: 2 MM/HR (ref 0–15)
HCT VFR BLD AUTO: 42.8 % (ref 40–53)
HGB BLD-MCNC: 14.9 G/DL (ref 13.3–17.7)
MCH RBC QN AUTO: 28.9 PG (ref 26.5–33)
MCHC RBC AUTO-ENTMCNC: 34.8 G/DL (ref 31.5–36.5)
MCV RBC AUTO: 83 FL (ref 78–100)
PLATELET # BLD AUTO: 238 10E3/UL (ref 150–450)
RBC # BLD AUTO: 5.15 10E6/UL (ref 4.4–5.9)
WBC # BLD AUTO: 5.6 10E3/UL (ref 4–11)

## 2024-06-03 PROCEDURE — 85027 COMPLETE CBC AUTOMATED: CPT | Performed by: PATHOLOGY

## 2024-06-03 PROCEDURE — 36415 COLL VENOUS BLD VENIPUNCTURE: CPT | Performed by: PATHOLOGY

## 2024-06-03 PROCEDURE — 84450 TRANSFERASE (AST) (SGOT): CPT | Performed by: PATHOLOGY

## 2024-06-03 PROCEDURE — 82040 ASSAY OF SERUM ALBUMIN: CPT | Performed by: PATHOLOGY

## 2024-06-03 PROCEDURE — 86140 C-REACTIVE PROTEIN: CPT | Performed by: PATHOLOGY

## 2024-06-03 PROCEDURE — 82565 ASSAY OF CREATININE: CPT | Performed by: PATHOLOGY

## 2024-06-03 PROCEDURE — 85652 RBC SED RATE AUTOMATED: CPT | Performed by: PATHOLOGY

## 2024-06-03 PROCEDURE — 84460 ALANINE AMINO (ALT) (SGPT): CPT | Performed by: PATHOLOGY

## 2024-06-12 ENCOUNTER — OFFICE VISIT (OUTPATIENT)
Dept: RHEUMATOLOGY | Facility: CLINIC | Age: 24
End: 2024-06-12
Payer: COMMERCIAL

## 2024-06-12 VITALS
HEART RATE: 93 BPM | BODY MASS INDEX: 28.04 KG/M2 | TEMPERATURE: 97.5 F | OXYGEN SATURATION: 99 % | WEIGHT: 238 LBS | DIASTOLIC BLOOD PRESSURE: 80 MMHG | SYSTOLIC BLOOD PRESSURE: 138 MMHG

## 2024-06-12 DIAGNOSIS — R79.89 ELEVATED LFTS: ICD-10-CM

## 2024-06-12 DIAGNOSIS — L40.50 PSORIATIC ARTHRITIS (H): Primary | ICD-10-CM

## 2024-06-12 PROCEDURE — 99214 OFFICE O/P EST MOD 30 MIN: CPT | Performed by: NURSE PRACTITIONER

## 2024-06-12 NOTE — PROGRESS NOTES
Rheumatology Clinic Visit  Cathy Carson CNP      Jez Francis  YOB: 2000    Age: 23 year old   MRN# [id number]       Date of Visit: 06/12/2024  Primary care provider: Anthony Simeon              Subjective:     Jez is a 23 year old male presents today for follow-up for PSA/PSO (DX 2023).  Current treatment: None    6/12/2024:  Stopped SSZ and NSAIDs in March due to elevated LFTs. Has had subtle mild short flares of joint paint since.  No current joint swelling.   Did not had have any ETOH around elevated LFT's, no NSAIDs, Does not recall viral illness in March.   PSO: No active.       2/12/24:   PSA: Over all 90-95% improved. L ankle is good. R knee is good most of the time. Has little flare ups will have a little pain. A little stiff in am. No swelling. Has weaned off of naproxen   PSO: Skin doing well.   ROS: No infections, fevers, SOB. Denies eye redness, pain  or visual changes.     11/02/2023:  Tolerating naproxen. %70 improved with pain and swelling of R knee L ankle. Scant active PSO on elbows not bothersome.     Consult 8/07/2023 HPI:    Joints: Had covid 10/22. R knee started hurting 12/22, very swollen, warmer but not red, denies severe pain with TTP. There was a fall on ice, but it hurt before this. Certain position of the knee would hurt. Exercise and on even ground hurts. Stairs hurt, had to quit running.  Is worse in the am, feels stiff, needs to work it out a little. Ibuprofen, rest helps . If is active physically then worsens.   L ankle has been hurting for 4 months, is stiff in am, has not noticed swelling. Hurts with use.   Has some back stiffness for less than an hour in am.   Mom reports pt swears going up the steps in the am and seems to be very uncomfortable in am.   PSO: Eczema as kid, now rash on elbows that has been told by PCP is PSO.  Went to a doctor as a teen, and was told PSO on elbows.     ROS: Patient denies recent infections, fevers, ADILSON, weight loss,  diarrhea, rashes, SOB,  new numbness or tingling. Denies red painful eyes.         Past Rheum tx:   SSZ 1 gm BID (11/23)-discontinue 3/24  , Naproxen 500 mg BID PRN (8/23). Discontinue 11/23    Social History  Lives in [place] of [place] with family,  Farming, school internship mechanical engineering  +tobacco  +ETOH, on weekends  No exercise past runner had to stop due to symptoms.     FMH:  Sister Lupus  Dad PSO after strep infection, also had reactive arthritis. Resolved.   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    Constitutional: Normal body habitus with out gross deformities. Well groomed.   Psych: nl judgement, orientation, memory, affect.  Eyes: wnl EOM,  vision, conjunctiva, sclera     Resp: nl work of breathing  CV:  no edema    Skin: no nail pitting, alopecia, rash, nodules or lesions of exposed areas of face, neck, bilateral upper and lower extremity. Facial pustular lesions resembling acne.    Neuro: Strength WNL of bilateral upper and lower extremity large muscle groups.     MSK- (T=tender to palpation, S=swelling)  TMJ: -T/S, FROM   Cervical spine:  FROM  Shoulders: -T/S, FROM   Elbows: -T/S, FROM   Wrists: -T/S, FROM   Hands: -T/S, FROM, Normal  strength. No dactylitis.   Hips: FROM  Knees: -T/S, FROM , slow ROM  Ankles: -T/S, FROM No enthesopathy or tenosynovitis.   Feet: -T/S, FROM . No dactylitis.         Labs:    Lab Results   Component Value Date    ALT 46  06/03/2024    AST 27 06/03/2024    CR 0.88 06/03/2024    CRP 1.8 (H) 01/12/2023      Latest Reference Range & Units 07/06/23 07:41   CRP Inflammation <5.00 mg/L 9.31 (H)      Latest Reference Range & Units 07/06/23 07:41   PATRICIO interpretation Negative  Negative      Latest Reference Range & Units 12/03/20 10:48 01/12/23 15:15 01/12/23 15:47 07/06/23 07:41   Sed Rate 0 - 15 mm/hr   16 (H) 11   Crystal Analysis No clinically significant crystals seen.   No clinically significant crystals seen.     AEROBIC BACTERIAL CULTURE ROUTINE   Rpt     ANAEROBIC BACTERIAL CULTURE ROUTINE   Rpt     Babesia Microti by PCR     Not Detected   Babesia Species by PCR     Not Detected   Chlamydia Trachomatis RNA TMA NOT DETECTED  NOT DETECTED      EHRLICHIA ANAPLASMA SP BY PCR     Rpt   CHLAMYDIA TRACHOMATIS/NEISSERIA GONORRHOEAE BY PCR  Rpt      GRAM STAIN   Rpt     Lyme Disease Antibodies Serum <0.90    0.04    Neisseria Gonorrhoeae RNA TMA NOT DETECTED  NOT DETECTED      FUNGAL OR YEAST CULTURE ROUTINE   Rpt        Latest Reference Range & Units 01/12/23 15:15   Cell Count Fluid Source  Knee, Right   RBC Fluid /uL 76,000   Total Nucleated Cells /uL 2,296   % Neutrophils Fluid % 48   % Lymphocytes Fluid % 44   % Mono/Macro Fluid % 7   % Eosinophils Fluid % 1   Color Fluid Colorless, Yellow  Red !   Appearance Fluid Clear  Cloudy !      Latest Reference Range & Units 08/07/23 10:34   Quantiferon-TB Gold Plus Result Negative  Negative   TB1 Ag minus Nil Value IU/mL 0.02   TB2 Ag minus Nil Value IU/mL 0.00   Mitogen minus Nil Result IU/mL 6.10   Nil Result IU/mL 0.03   QUANTIFERON-TB GOLD PLUS  Rpt   Hep B Surface Agn Nonreactive  Nonreactive   Hepatitis B Core Namita Nonreactive  Nonreactive   Hepatitis C Antibody Nonreactive  Nonreactive   B locus  B27 Neg   [id number] Method  SSOP     Imaging:   Narrative & Impression   MR  left   ankle without  contrast 8/18/2023 3:35 PM       Impression:     1. Significant synovial enhancement throughout  the medial, lateral and  posterior ankle including the medial flexor and extensor tendon tendon  sheath, as best seen on the subtraction images. These findings are  nonspecific and suggest reactive inflammatory versus infectious  etiology, such as rheumatoid, psoriasis, crystal deposition disease.  Infectious causes cannot be excluded.   2. Mild reactive periostitis with edema and enhancement at the  superior and lateral margins of the calcaneus.  3. Moderate joint effusion.     JUTTA ELLERMANN, MD     MRI R Knee 01/09/2023 [place]: Showed effusion and synovitis.          Assessment and Plan:     1) PSA/PSO:  At time of consult 22 yr old male with previous dx of PSO as a teen. Pt reports had covid 10/22, then 12/22 developed R knee swelling and pain, saw Dr. Chin at [facility] had R knee MRI that showed effusion and synovitis.  R knee swelling had improved over time but continued to hurt and stiff in am. Spring 2023 developed similar symptoms in L ankle with pain, swelling stiffness that is worse in am. Exam at consult showed mild effusion and TTP of L ankle.   Lab eval shows abnormal CRP 9.31 and synovial fluid slight inflammatory with nucleated cells 2,296.  L ankle MRI 8/18/2023    1. Significant synovial enhancement throughout the medial, lateral and  posterior ankle including the medial flexor and extensor tendon tendon  sheath, as best seen on the subtraction images. These findings are  nonspecific and suggest reactive inflammatory versus infectious  etiology, such as rheumatoid, psoriasis, crystal deposition disease.  Infectious causes cannot be excluded.   2. Mild reactive periostitis with edema and enhancement at the  superior and lateral margins of the calcaneus.  3. Moderate joint effusion.  Labs normal/ negative GC, Lyme, RF, CCP, CBC, PATRICIO, synovial fluid 1/23 negative for crystals, cx's negative.     Discussion: Diff dx of PSA vs reactive arthritis, given hx of PSO most likely represents PSA  however given onset post covid infection will continue to consider reactive arthritis. %70 improvement on Naproxen 500 mg BID, now on SSZ with 90% improvement and transitioned off of naproxen. Toxicity labs updated and WNL, CRP now normal. Doing very well. Continue current tx regimen.     Update 6/12/2024:   Did well on SSZ however increase in ALT to 153 and AST 54, pt continues to do well with minor flares Exam currently. Normal. Will hold off on therapy. And monitor. If swelling and stiffness returns consider TNFi at that time.         Pt instructions:    1) Labs every 10 weeks    2) See me back Sept    Pt states understanding and agreement to plan of care, has no further questions.     Cathy Carson, CNP  Rheumatology, [facility]

## 2024-06-12 NOTE — NURSING NOTE
"Jez Francis's goals for this visit include:   Chief Complaint   Patient presents with    RECHECK     Psoriatic arthritis        PCP: Anthony Simeon    Referring Provider:  Referred Self, MD  No address on file      Initial /80 (BP Location: Left arm, Patient Position: Sitting, Cuff Size: Adult Regular)   Pulse 93   Temp 97.5  F (36.4  C) (Oral)   Wt 108 kg (238 lb)   SpO2 99%   BMI 28.04 kg/m   Estimated body mass index is 28.04 kg/m  as calculated from the following:    Height as of 7/6/23: 1.962 m (6' 5.25\").    Weight as of this encounter: 108 kg (238 lb).    Medication Reconciliation: complete    Do you need any medication refills at today's visit? none    MILDRED Guo  Rheumatology/Infectious disease  [facility]   [phone]      "

## 2025-03-16 ENCOUNTER — HEALTH MAINTENANCE LETTER (OUTPATIENT)
Age: 25
End: 2025-03-16

## 2025-03-24 NOTE — NURSING NOTE
Comprehensive Intake Entered On:  12/3/2020 10:33 AM CST    Performed On:  12/3/2020 10:29 AM CST by Destinee Cárdenas CMA               Summary   Chief Complaint :   follow up STD   Menstrual Status :   N/A   Weight Measured :   229 lb(Converted to: 229 lb 0 oz, 103.873 kg)    Systolic Blood Pressure :   138 mmHg (HI)    Diastolic Blood Pressure :   66 mmHg   Mean Arterial Pressure :   90 mmHg   Peripheral Pulse Rate :   76 bpm   BP Site :   Right arm   Pulse Site :   Radial artery   BP Method :   Manual   HR Method :   Manual   Temperature Tympanic :   97.9 DegF(Converted to: 36.6 DegC)    Destinee Cárdenas CMA - 12/3/2020 10:29 AM CST   Health Status   Allergies Verified? :   Yes   Medication History Verified? :   Yes   Immunizations Current :   Yes   Pre-Visit Planning Status :   Not completed   Destinee Cárdenas CMA - 12/3/2020 10:29 AM CST   Meds / Allergies   (As Of: 12/3/2020 10:33:42 AM CST)   Allergies (Active)   No Known Medication Allergies  Estimated Onset Date:   Unspecified ; Created By:   Diana Ireland; Reaction Status:   Active ; Category:   Drug ; Substance:   No Known Medication Allergies ; Type:   Allergy ; Updated By:   Diana Ireland; Reviewed Date:   11/3/2020 9:37 AM CST        Medication List   (As Of: 12/3/2020 10:33:42 AM CST)   Prescription/Discharge Order    azithromycin  :   azithromycin ; Status:   Processing ; Ordered As Mnemonic:   azithromycin 500 mg oral tablet ; Ordering Provider:   Albino Underwood PA-C; Action Display:   Complete ; Catalog Code:   azithromycin ; Order Dt/Tm:   12/3/2020 10:31:58 AM CST          buPROPion  :   buPROPion ; Status:   Prescribed ; Ordered As Mnemonic:   buPROPion 75 mg oral tablet ; Simple Display Line:   75 mg, 1 tab(s), Oral, bid, 28 tab(s), 0 Refill(s) ; Ordering Provider:   Anthony Simeon MD; Catalog Code:   buPROPion ; Order Dt/Tm:   8/3/2020 2:57:15 PM CDT          doxycycline  :   doxycycline ; Status:   Processing ; Ordered As Mnemonic:    doxycycline hyclate 100 mg oral tablet ; Ordering Provider:   Albino Underwood PA-C; Action Display:   Complete ; Catalog Code:   doxycycline ; Order Dt/Tm:   12/3/2020 10:31:58 AM CST          escitalopram  :   escitalopram ; Status:   Prescribed ; Ordered As Mnemonic:   escitalopram 20 mg oral tablet ; Simple Display Line:   1 tab(s), Oral, daily, 14 tab(s), 0 Refill(s) ; Ordering Provider:   Anthony Simeon MD; Catalog Code:   escitalopram ; Order Dt/Tm:   8/3/2020 2:57:16 PM CDT            ID Risk Screen   Recent Travel History :   No recent travel   Family Member Travel History :   No recent travel   Other Exposure to Infectious Disease :   Unknown   Destinee Cárdenas CMA - 12/3/2020 10:29 AM CST   Social History   Social History   (As Of: 12/3/2020 10:33:42 AM CST)   Alcohol:        Never, 2-3 times per week   (Last Updated: 7/22/2019 9:44:32 AM CDT by Cat Ferrer)          Tobacco:        Never (less than 100 in lifetime)   (Last Updated: 12/3/2020 10:31:38 AM CST by Destinee Cárdenas CMA)          Electronic Cigarette/Vaping:        Electronic Cigarette Use: 1-25 inhales/day.  Type: Nicotine infused.   (Last Updated: 12/3/2020 10:31:30 AM CST by Destinee Cárdenas CMA)   Detail Level: Simple Render Risk Assessment In Note?: no Comment: Mohs 3/2024

## 2025-07-21 ENCOUNTER — TELEPHONE (OUTPATIENT)
Dept: RHEUMATOLOGY | Facility: CLINIC | Age: 25
End: 2025-07-21
Payer: COMMERCIAL

## 2025-07-21 ENCOUNTER — MYC MEDICAL ADVICE (OUTPATIENT)
Dept: RHEUMATOLOGY | Facility: CLINIC | Age: 25
End: 2025-07-21
Payer: COMMERCIAL

## 2025-07-21 DIAGNOSIS — L40.50 PSORIATIC ARTHRITIS (H): ICD-10-CM

## 2025-07-21 DIAGNOSIS — L40.9 PSORIASIS: Primary | ICD-10-CM

## 2025-07-21 NOTE — TELEPHONE ENCOUNTER
7/21/2025 2:39PM  Left Voicemail (1st Attempt) and Sent Mychart (1st Attempt) for the patient to call back and schedule the following:    Appointment type: New Rheumatology  Provider: Provider that can see Psoriatic Arthritis  Return date: Next available (offer waitlist)  Specialty phone number: [phone]  Additional appointment(s) needed: NA  Additonal Notes: 7/21 LVM and MYC for pt to schedule New Rheumatology at [facility] or [place] or whichever location pt would prefer. Previously saw Cathy Carson who is out on indefinite medical leave. [facility]   Rheumatology, Gastroenterology, Nephrology, Infectious Disease, Pulmonology  [facility] [place] [place]

## 2025-07-22 NOTE — TELEPHONE ENCOUNTER
Former patient of Cathy Carson NP, last seen 6/12/24, Dx Psoriatic arthritis.    Pt instructions:     1) Labs every 10 weeks     2) See me back Sept    No Rheum medications prescribed.    Patient canceled September appointment.   No labs completed since June 2024.    Patient states he has Intermittent left hip pain, moderate and sharp, onset 2 weeks ago.   Occasionally limps with walking and stiffness.Walking makes pain more noticeable as well as straightening leg then lifting it up.    Denies fevers, rashes, infections, swelling or redness    Trigger may have been increased walking and getting out of chair for job, previously more sedentary.    Using Ibuprofen as needed and stretching provide relief.

## 2025-08-17 ENCOUNTER — ANCILLARY PROCEDURE (OUTPATIENT)
Dept: GENERAL RADIOLOGY | Facility: CLINIC | Age: 25
End: 2025-08-17
Attending: STUDENT IN AN ORGANIZED HEALTH CARE EDUCATION/TRAINING PROGRAM
Payer: COMMERCIAL

## 2025-08-17 DIAGNOSIS — L40.50 PSORIATIC ARTHRITIS (H): ICD-10-CM

## 2025-08-17 PROCEDURE — 72200 X-RAY EXAM SI JOINTS: CPT | Mod: TC | Performed by: RADIOLOGY

## 2025-08-17 PROCEDURE — 73522 X-RAY EXAM HIPS BI 3-4 VIEWS: CPT | Mod: TC | Performed by: RADIOLOGY

## 2025-08-27 ENCOUNTER — LAB (OUTPATIENT)
Dept: LAB | Facility: CLINIC | Age: 25
End: 2025-08-27
Payer: COMMERCIAL

## 2025-08-27 DIAGNOSIS — L40.50 PSORIATIC ARTHRITIS (H): ICD-10-CM

## 2025-08-27 LAB
ALT SERPL W P-5'-P-CCNC: 27 U/L (ref 0–70)
AST SERPL W P-5'-P-CCNC: 23 U/L (ref 0–45)
CREAT SERPL-MCNC: 0.76 MG/DL (ref 0.67–1.17)
CRP SERPL-MCNC: 9.36 MG/L
EGFRCR SERPLBLD CKD-EPI 2021: >90 ML/MIN/1.73M2
ERYTHROCYTE [DISTWIDTH] IN BLOOD BY AUTOMATED COUNT: 11.6 % (ref 10–15)
ERYTHROCYTE [SEDIMENTATION RATE] IN BLOOD BY WESTERGREN METHOD: 15 MM/HR (ref 0–15)
HCT VFR BLD AUTO: 42.1 % (ref 40–53)
HGB BLD-MCNC: 14 G/DL (ref 13.3–17.7)
MCH RBC QN AUTO: 27.9 PG (ref 26.5–33)
MCHC RBC AUTO-ENTMCNC: 33.3 G/DL (ref 31.5–36.5)
MCV RBC AUTO: 84 FL (ref 78–100)
PLATELET # BLD AUTO: 282 10E3/UL (ref 150–450)
RBC # BLD AUTO: 5.01 10E6/UL (ref 4.4–5.9)
WBC # BLD AUTO: 5.72 10E3/UL (ref 4–11)

## 2025-08-27 PROCEDURE — 84450 TRANSFERASE (AST) (SGOT): CPT

## 2025-08-27 PROCEDURE — 82565 ASSAY OF CREATININE: CPT

## 2025-08-27 PROCEDURE — 85027 COMPLETE CBC AUTOMATED: CPT

## 2025-08-27 PROCEDURE — 36415 COLL VENOUS BLD VENIPUNCTURE: CPT

## 2025-08-27 PROCEDURE — 85652 RBC SED RATE AUTOMATED: CPT

## 2025-08-27 PROCEDURE — 86140 C-REACTIVE PROTEIN: CPT

## 2025-08-27 PROCEDURE — 84460 ALANINE AMINO (ALT) (SGPT): CPT

## 2025-08-28 ENCOUNTER — OFFICE VISIT (OUTPATIENT)
Dept: RHEUMATOLOGY | Facility: CLINIC | Age: 25
End: 2025-08-28
Payer: COMMERCIAL